# Patient Record
Sex: MALE | Race: WHITE | NOT HISPANIC OR LATINO | Employment: FULL TIME | URBAN - NONMETROPOLITAN AREA
[De-identification: names, ages, dates, MRNs, and addresses within clinical notes are randomized per-mention and may not be internally consistent; named-entity substitution may affect disease eponyms.]

---

## 2021-03-02 ENCOUNTER — IMMUNIZATION (OUTPATIENT)
Dept: VACCINE CLINIC | Facility: HOSPITAL | Age: 61
End: 2021-03-02

## 2021-03-02 PROCEDURE — 91300 HC SARSCOV02 VAC 30MCG/0.3ML IM: CPT | Performed by: NURSE PRACTITIONER

## 2021-03-02 PROCEDURE — 0001A: CPT | Performed by: NURSE PRACTITIONER

## 2021-03-23 ENCOUNTER — IMMUNIZATION (OUTPATIENT)
Dept: VACCINE CLINIC | Facility: HOSPITAL | Age: 61
End: 2021-03-23

## 2021-03-23 PROCEDURE — 0002A: CPT | Performed by: THORACIC SURGERY (CARDIOTHORACIC VASCULAR SURGERY)

## 2021-03-23 PROCEDURE — 91300 HC SARSCOV02 VAC 30MCG/0.3ML IM: CPT | Performed by: THORACIC SURGERY (CARDIOTHORACIC VASCULAR SURGERY)

## 2022-12-28 ENCOUNTER — OFFICE VISIT (OUTPATIENT)
Dept: FAMILY MEDICINE CLINIC | Facility: CLINIC | Age: 62
End: 2022-12-28

## 2022-12-28 VITALS
OXYGEN SATURATION: 98 % | BODY MASS INDEX: 32.48 KG/M2 | TEMPERATURE: 98.2 F | HEART RATE: 63 BPM | WEIGHT: 232 LBS | SYSTOLIC BLOOD PRESSURE: 142 MMHG | DIASTOLIC BLOOD PRESSURE: 72 MMHG | HEIGHT: 71 IN

## 2022-12-28 DIAGNOSIS — R53.83 OTHER FATIGUE: ICD-10-CM

## 2022-12-28 DIAGNOSIS — K91.2 MALNUTRITION FOLLOWING GASTROINTESTINAL SURGERY: ICD-10-CM

## 2022-12-28 DIAGNOSIS — D64.9 ANEMIA, UNSPECIFIED TYPE: Primary | ICD-10-CM

## 2022-12-28 PROCEDURE — 99204 OFFICE O/P NEW MOD 45 MIN: CPT | Performed by: FAMILY MEDICINE

## 2022-12-28 RX ORDER — METOPROLOL TARTRATE 50 MG/1
50 TABLET, FILM COATED ORAL 2 TIMES DAILY
COMMUNITY
Start: 2022-11-10 | End: 2023-04-03 | Stop reason: ALTCHOICE

## 2022-12-28 NOTE — PROGRESS NOTES
" Subjective   Blas James is a 62 y.o. male.     Chief Complaint   Patient presents with   • Establish Care             History of Present Illness     Works [employer].  Gets lab work done through them  Has had fatigue several months, blamed on covid.  Had labs at work, hg 6.4  Has not had a colonoscopy  He had mini gastric bypass 2007 in [place] dr andrade.   He says for  Years, had the annual tests and always were fine, so he just quit having them done.   No current chest pain or sob.  Just fatigue.     Review of Systems   Constitutional: Positive for fatigue. Negative for chills and fever.   HENT: Negative for congestion, ear discharge, ear pain, facial swelling, hearing loss, postnasal drip, rhinorrhea, sinus pressure, sore throat, trouble swallowing and voice change.    Eyes: Negative for discharge, redness and visual disturbance.   Respiratory: Negative for cough, chest tightness, shortness of breath and wheezing.    Cardiovascular: Negative for chest pain and palpitations.   Gastrointestinal: Negative for abdominal pain, blood in stool, constipation, diarrhea, nausea and vomiting.   Endocrine: Negative for polydipsia and polyuria.   Genitourinary: Negative for dysuria, flank pain, hematuria and urgency.   Musculoskeletal: Negative for arthralgias, back pain, joint swelling and myalgias.   Skin: Negative for rash.   Neurological: Negative for dizziness, weakness, numbness and headaches.   Hematological: Negative for adenopathy.   Psychiatric/Behavioral: Negative for confusion and sleep disturbance. The patient is not nervous/anxious.            /72 (BP Location: Left arm, Patient Position: Sitting, Cuff Size: Adult)   Pulse 63   Temp 98.2 °F (36.8 °C) (Infrared)   Ht 180.3 cm (71\")   Wt 105 kg (232 lb)   SpO2 98%   BMI 32.36 kg/m²       Objective     Physical Exam  Vitals and nursing note reviewed.   Constitutional:       Appearance: Normal appearance. He is well-developed.   HENT: "      Head: Normocephalic and atraumatic.      Right Ear: External ear normal.      Left Ear: External ear normal.      Nose: Nose normal. No rhinorrhea.   Eyes:      General: No scleral icterus.     Extraocular Movements: Extraocular movements intact.      Conjunctiva/sclera: Conjunctivae normal.      Pupils: Pupils are equal, round, and reactive to light.   Neck:      Vascular: No carotid bruit.   Cardiovascular:      Rate and Rhythm: Normal rate and regular rhythm.      Heart sounds: Normal heart sounds.     No friction rub. No gallop.   Pulmonary:      Effort: Pulmonary effort is normal.      Breath sounds: Normal breath sounds.   Abdominal:      General: Bowel sounds are normal. There is no distension.      Palpations: Abdomen is soft.      Tenderness: There is no abdominal tenderness.   Musculoskeletal:         General: No deformity. Normal range of motion.      Cervical back: Normal range of motion and neck supple.   Skin:     General: Skin is warm and dry.      Findings: No erythema or rash.   Neurological:      Mental Status: He is alert and oriented to person, place, and time.      Cranial Nerves: No cranial nerve deficit.   Psychiatric:         Behavior: Behavior normal.         Thought Content: Thought content normal.         Judgment: Judgment normal.             PAST MEDICAL HISTORY   No past medical history on file.   PAST SURGICAL HISTORY   No past surgical history on file.   SOCIAL HISTORY     Social History     Socioeconomic History   • Marital status:    Tobacco Use   • Smoking status: Never     Passive exposure: Never   • Smokeless tobacco: Never   Substance and Sexual Activity   • Alcohol use: Yes     Alcohol/week: 2.0 standard drinks     Types: 1 Cans of beer, 1 Shots of liquor per week   • Drug use: Never   • Sexual activity: Defer      ALLERGIES   Patient has no known allergies.   MEDICATIONS     Current Outpatient Medications   Medication Sig Dispense Refill   • LISINOPRIL PO 40 mg.      • metoprolol tartrate (LOPRESSOR) 50 MG tablet Take 50 mg by mouth 2 (Two) Times a Day.       No current facility-administered medications for this visit.        The following portions of the patient's history were reviewed and updated as appropriate: allergies, current medications, past family history, past medical history, past social history, past surgical history and problem list.        Assessment & Plan   Diagnoses and all orders for this visit:    1. Anemia, unspecified type (Primary)  -     Ambulatory Referral to Hematology    2. Malnutrition following gastrointestinal surgery  -     Ambulatory Referral to Hematology    3. Other fatigue  -     Ambulatory Referral to Hematology      I will arrange iron infusions at [facility].   I called [facility] and talked to rochelle cameron    Also    I ordered other labs he can  Have done at work.    I ordered b12.folate, b1, b6, ferritin, tsh, vit k, vit d, pth.   I gave him a stool fit test.     Cheryl at [facility] has been helpful.  She said the insurance company needed iron studies and referral to hematologist may help facilitate things.       I put in referral.     Cheryl at [facility] said she would call the patient and explain.     I did tell the patient over the phone to start ferrous sulfate 325mg daily    During our office visit, I told him he needs to increase his protein intake.                    No follow-ups on file.                  This document has been electronically signed by Trell Brooks MD on December 28, 2022 11:14 CST

## 2023-01-03 ENCOUNTER — TELEPHONE (OUTPATIENT)
Dept: FAMILY MEDICINE CLINIC | Facility: CLINIC | Age: 63
End: 2023-01-03
Payer: COMMERCIAL

## 2023-01-03 DIAGNOSIS — D64.9 ANEMIA, UNSPECIFIED TYPE: Primary | ICD-10-CM

## 2023-01-03 DIAGNOSIS — K91.2 MALNUTRITION FOLLOWING GASTROINTESTINAL SURGERY: ICD-10-CM

## 2023-01-03 DIAGNOSIS — R53.83 OTHER FATIGUE: ICD-10-CM

## 2023-01-03 NOTE — TELEPHONE ENCOUNTER
PATIENT'S WIFE SAID HE IS NOT ABLE TO GET HIS LABS DRAWN AT THE PLACE HE PLANNED TO GO THIS WEEK.  SHE ASKED IF YOU WILL PUT IN ORDERS FOR HIM TO GET THEM AT [facility] IN [place].

## 2023-01-04 ENCOUNTER — LAB (OUTPATIENT)
Dept: LAB | Facility: HOSPITAL | Age: 63
End: 2023-01-04
Payer: COMMERCIAL

## 2023-01-04 DIAGNOSIS — D64.9 ANEMIA, UNSPECIFIED TYPE: ICD-10-CM

## 2023-01-04 DIAGNOSIS — K91.2 MALNUTRITION FOLLOWING GASTROINTESTINAL SURGERY: ICD-10-CM

## 2023-01-04 DIAGNOSIS — R53.83 OTHER FATIGUE: ICD-10-CM

## 2023-01-04 LAB
25(OH)D3 SERPL-MCNC: 18.4 NG/ML (ref 30–100)
FERRITIN SERPL-MCNC: 4.79 NG/ML (ref 30–400)
FOLATE SERPL-MCNC: 6.18 NG/ML (ref 4.78–24.2)
IRON 24H UR-MRATE: 14 MCG/DL (ref 59–158)
IRON SATN MFR SERPL: 3 % (ref 20–50)
PTH-INTACT SERPL-MCNC: 106 PG/ML (ref 15–65)
TIBC SERPL-MCNC: 499 MCG/DL (ref 298–536)
TRANSFERRIN SERPL-MCNC: 335 MG/DL (ref 200–360)
TSH SERPL DL<=0.05 MIU/L-ACNC: 2.85 UIU/ML (ref 0.27–4.2)
VIT B12 BLD-MCNC: 497 PG/ML (ref 211–946)

## 2023-01-04 PROCEDURE — 83540 ASSAY OF IRON: CPT

## 2023-01-04 PROCEDURE — 82746 ASSAY OF FOLIC ACID SERUM: CPT

## 2023-01-04 PROCEDURE — 84443 ASSAY THYROID STIM HORMONE: CPT

## 2023-01-04 PROCEDURE — 83970 ASSAY OF PARATHORMONE: CPT

## 2023-01-04 PROCEDURE — 82607 VITAMIN B-12: CPT

## 2023-01-04 PROCEDURE — 84207 ASSAY OF VITAMIN B-6: CPT

## 2023-01-04 PROCEDURE — 85027 COMPLETE CBC AUTOMATED: CPT

## 2023-01-04 PROCEDURE — 82728 ASSAY OF FERRITIN: CPT

## 2023-01-04 PROCEDURE — 84466 ASSAY OF TRANSFERRIN: CPT

## 2023-01-04 PROCEDURE — 82306 VITAMIN D 25 HYDROXY: CPT

## 2023-01-04 PROCEDURE — 85045 AUTOMATED RETICULOCYTE COUNT: CPT

## 2023-01-04 PROCEDURE — 36415 COLL VENOUS BLD VENIPUNCTURE: CPT

## 2023-01-05 ENCOUNTER — PATIENT ROUNDING (BHMG ONLY) (OUTPATIENT)
Dept: FAMILY MEDICINE CLINIC | Facility: CLINIC | Age: 63
End: 2023-01-05
Payer: COMMERCIAL

## 2023-01-05 DIAGNOSIS — E55.9 VITAMIN D DEFICIENCY: Primary | ICD-10-CM

## 2023-01-05 DIAGNOSIS — K91.2 MALNUTRITION FOLLOWING GASTROINTESTINAL SURGERY: ICD-10-CM

## 2023-01-05 DIAGNOSIS — E21.3 HYPERPARATHYROIDISM: ICD-10-CM

## 2023-01-05 LAB
DEPRECATED RDW RBC AUTO: 46.5 FL (ref 37–54)
ERYTHROCYTE [DISTWIDTH] IN BLOOD BY AUTOMATED COUNT: 19.8 % (ref 12.3–15.4)
HCT VFR BLD AUTO: 25.6 % (ref 37.5–51)
HGB BLD-MCNC: 6.9 G/DL (ref 13–17.7)
MCH RBC QN AUTO: 18 PG (ref 26.6–33)
MCHC RBC AUTO-ENTMCNC: 27 G/DL (ref 31.5–35.7)
MCV RBC AUTO: 66.7 FL (ref 79–97)
PLATELET # BLD AUTO: 244 10*3/MM3 (ref 140–450)
RBC # BLD AUTO: 3.84 10*6/MM3 (ref 4.14–5.8)
RETICS # AUTO: 0.08 10*6/MM3 (ref 0.02–0.13)
RETICS/RBC NFR AUTO: 2.07 % (ref 0.7–1.9)
WBC NRBC COR # BLD: 7.64 10*3/MM3 (ref 3.4–10.8)

## 2023-01-05 NOTE — PROGRESS NOTES
January 5, 2023    Hello, may I speak with Blas James?    My name is Maxine Tejeda      I am  with [facility] [place]  [address]  [place] [address] [phone].    Before we get started may I verify your date of birth? 1960    I am calling to officially welcome you to our practice and ask about your recent visit. Is this a good time to talk? no    Tell me about your visit with us. What things went well?  left voicemail       We're always looking for ways to make our patients' experiences even better. Do you have recommendations on ways we may improve?  left voicemail    Overall were you satisfied with your first visit to our practice? Left voicemail       I appreciate you taking the time to speak with me today. Is there anything else I can do for you? Left voicemail      Thank you, and have a great day.

## 2023-01-09 LAB — PYRIDOXAL PHOS SERPL-MCNC: 8 UG/L (ref 3.4–65.2)

## 2023-01-10 DIAGNOSIS — Z12.11 ENCOUNTER FOR SCREENING FECAL OCCULT BLOOD TESTING: Primary | ICD-10-CM

## 2023-01-10 LAB — HEMOCCULT STL QL IA: NEGATIVE

## 2023-01-10 PROCEDURE — 82274 ASSAY TEST FOR BLOOD FECAL: CPT | Performed by: FAMILY MEDICINE

## 2023-01-11 ENCOUNTER — CONSULT (OUTPATIENT)
Dept: ONCOLOGY | Facility: CLINIC | Age: 63
End: 2023-01-11
Payer: COMMERCIAL

## 2023-01-11 ENCOUNTER — LAB (OUTPATIENT)
Dept: ONCOLOGY | Facility: HOSPITAL | Age: 63
End: 2023-01-11
Payer: COMMERCIAL

## 2023-01-11 VITALS
OXYGEN SATURATION: 98 % | WEIGHT: 234.3 LBS | DIASTOLIC BLOOD PRESSURE: 83 MMHG | BODY MASS INDEX: 32.68 KG/M2 | SYSTOLIC BLOOD PRESSURE: 162 MMHG | TEMPERATURE: 98 F | HEART RATE: 64 BPM

## 2023-01-11 DIAGNOSIS — D50.0 IRON DEFICIENCY ANEMIA DUE TO CHRONIC BLOOD LOSS: ICD-10-CM

## 2023-01-11 DIAGNOSIS — K90.9 IRON MALABSORPTION: ICD-10-CM

## 2023-01-11 DIAGNOSIS — Z98.84 HISTORY OF GASTRIC BYPASS: ICD-10-CM

## 2023-01-11 DIAGNOSIS — D50.0 IRON DEFICIENCY ANEMIA DUE TO CHRONIC BLOOD LOSS: Primary | ICD-10-CM

## 2023-01-11 PROBLEM — D50.9 IRON DEFICIENCY ANEMIA: Status: ACTIVE | Noted: 2023-01-11

## 2023-01-11 LAB
BASOPHILS # BLD AUTO: 0.13 10*3/MM3 (ref 0–0.2)
BASOPHILS NFR BLD AUTO: 1.5 % (ref 0–1.5)
DEPRECATED RDW RBC AUTO: 55.8 FL (ref 37–54)
EOSINOPHIL # BLD AUTO: 0.16 10*3/MM3 (ref 0–0.4)
EOSINOPHIL NFR BLD AUTO: 1.8 % (ref 0.3–6.2)
ERYTHROCYTE [DISTWIDTH] IN BLOOD BY AUTOMATED COUNT: 22.9 % (ref 12.3–15.4)
FERRITIN SERPL-MCNC: 5.56 NG/ML (ref 30–400)
HCT VFR BLD AUTO: 28.5 % (ref 37.5–51)
HGB BLD-MCNC: 7.5 G/DL (ref 13–17.7)
IMM GRANULOCYTES # BLD AUTO: 0.04 10*3/MM3 (ref 0–0.05)
IMM GRANULOCYTES NFR BLD AUTO: 0.5 % (ref 0–0.5)
IRON 24H UR-MRATE: 13 MCG/DL (ref 59–158)
IRON SATN MFR SERPL: 2 % (ref 20–50)
LYMPHOCYTES # BLD AUTO: 2.82 10*3/MM3 (ref 0.7–3.1)
LYMPHOCYTES NFR BLD AUTO: 31.8 % (ref 19.6–45.3)
MCH RBC QN AUTO: 18.2 PG (ref 26.6–33)
MCHC RBC AUTO-ENTMCNC: 26.3 G/DL (ref 31.5–35.7)
MCV RBC AUTO: 69.3 FL (ref 79–97)
MONOCYTES # BLD AUTO: 0.57 10*3/MM3 (ref 0.1–0.9)
MONOCYTES NFR BLD AUTO: 6.4 % (ref 5–12)
NEUTROPHILS NFR BLD AUTO: 5.15 10*3/MM3 (ref 1.7–7)
NEUTROPHILS NFR BLD AUTO: 58 % (ref 42.7–76)
NRBC BLD AUTO-RTO: 0 /100 WBC (ref 0–0.2)
PLATELET # BLD AUTO: 279 10*3/MM3 (ref 140–450)
PMV BLD AUTO: 9.8 FL (ref 6–12)
RBC # BLD AUTO: 4.11 10*6/MM3 (ref 4.14–5.8)
TIBC SERPL-MCNC: 538 MCG/DL (ref 298–536)
TRANSFERRIN SERPL-MCNC: 361 MG/DL (ref 200–360)
WBC NRBC COR # BLD: 8.87 10*3/MM3 (ref 3.4–10.8)

## 2023-01-11 PROCEDURE — 1125F AMNT PAIN NOTED PAIN PRSNT: CPT | Performed by: INTERNAL MEDICINE

## 2023-01-11 PROCEDURE — 82728 ASSAY OF FERRITIN: CPT | Performed by: INTERNAL MEDICINE

## 2023-01-11 PROCEDURE — 83540 ASSAY OF IRON: CPT | Performed by: INTERNAL MEDICINE

## 2023-01-11 PROCEDURE — 85025 COMPLETE CBC W/AUTO DIFF WBC: CPT | Performed by: INTERNAL MEDICINE

## 2023-01-11 PROCEDURE — 99204 OFFICE O/P NEW MOD 45 MIN: CPT | Performed by: INTERNAL MEDICINE

## 2023-01-11 PROCEDURE — 84466 ASSAY OF TRANSFERRIN: CPT | Performed by: INTERNAL MEDICINE

## 2023-01-11 PROCEDURE — G0463 HOSPITAL OUTPT CLINIC VISIT: HCPCS | Performed by: INTERNAL MEDICINE

## 2023-01-11 RX ORDER — FOLIC ACID 1 MG/1
1 TABLET ORAL DAILY
Qty: 90 TABLET | Refills: 1 | Status: SHIPPED | OUTPATIENT
Start: 2023-01-11

## 2023-01-11 RX ORDER — MULTIVIT WITH MINERALS/LUTEIN
250 TABLET ORAL DAILY
COMMUNITY
End: 2023-04-03

## 2023-01-11 RX ORDER — CETIRIZINE HYDROCHLORIDE 10 MG/1
10 TABLET ORAL DAILY
COMMUNITY
End: 2023-04-03

## 2023-01-11 RX ORDER — MULTIPLE VITAMINS W/ MINERALS TAB 9MG-400MCG
1 TAB ORAL DAILY
COMMUNITY

## 2023-01-11 RX ORDER — LANOLIN ALCOHOL/MO/W.PET/CERES
1000 CREAM (GRAM) TOPICAL DAILY
Qty: 90 TABLET | Refills: 1 | Status: SHIPPED | OUTPATIENT
Start: 2023-01-11

## 2023-01-11 RX ORDER — FERROUS SULFATE 325(65) MG
325 TABLET ORAL
COMMUNITY
End: 2023-03-24

## 2023-01-11 NOTE — PROGRESS NOTES
DATE OF CONSULT: 1/12/2023    REQUESTING SOURCE:  rTell Brooks MD  [address] [place] [id number]      REASON FOR CONSULTATION: Iron deficiency anemia, history of gastric bypass      HISTORY OF PRESENT ILLNESS:    62-year-old male with medical problem consisting of hypertension, history of gastric bypass in 2007, chronic back pain and neck pain has been referred to [facility] for further evaluation and recommendation regarding severe iron deficiency anemia with hemoglobin of 6.9 on January 4, 2023.  Patient states he has been taking ferrous sulfate 2 tablets twice daily since December 29, 2022.  Continues to have fatigue.  Denies any dark stools or any bright red bleeding per rectum.  States he had intermittent implanted bleeding in stool which she thought was from hemorrhoids.  Complains of exertional shortness of breath.  States his fatigue has been progressively getting worse over the last 2 years.  States he was found to be anemic with hemoglobin of 6.5 around Thanksgiving at work.  He has not had any blood work done for the last many years prior to Thanksgiving.  Complains of intermittent tingling and numbness affecting upper extremity.  Admits to 15 pound of weight loss in last 1 year.  States his appetite is good.  Denies any new lymph node enlargement.              PAST MEDICAL HISTORY:    No past medical history on file.    PAST SURGICAL HISTORY:  No past surgical history on file.    ALLERGIES:    No Known Allergies    SOCIAL HISTORY:   Social History     Tobacco Use   • Smoking status: Never     Passive exposure: Never   • Smokeless tobacco: Never   Substance Use Topics   • Alcohol use: Yes     Alcohol/week: 2.0 standard drinks     Types: 1 Cans of beer, 1 Shots of liquor per week   • Drug use: Never       CURRENT MEDICATIONS:    Current Outpatient Medications   Medication Sig Dispense Refill   • cetirizine (zyrTEC) 10 MG tablet Take 10 mg by mouth Daily.     • ferrous  sulfate 325 (65 FE) MG tablet Take 325 mg by mouth Daily With Breakfast.     • LISINOPRIL PO 40 mg.     • metoprolol tartrate (LOPRESSOR) 50 MG tablet Take 50 mg by mouth 2 (Two) Times a Day.     • multivitamin with minerals tablet tablet Take 1 tablet by mouth Daily.     • vitamin C (ASCORBIC ACID) 250 MG tablet Take 250 mg by mouth Daily.     • folic acid (FOLVITE) 1 MG tablet Take 1 tablet by mouth Daily. 90 tablet 1   • vitamin B-12 (CYANOCOBALAMIN) 1000 MCG tablet Take 1 tablet by mouth Daily. 90 tablet 1     No current facility-administered medications for this visit.        HOME MEDICATIONS:   Current Outpatient Medications on File Prior to Visit   Medication Sig Dispense Refill   • cetirizine (zyrTEC) 10 MG tablet Take 10 mg by mouth Daily.     • ferrous sulfate 325 (65 FE) MG tablet Take 325 mg by mouth Daily With Breakfast.     • LISINOPRIL PO 40 mg.     • metoprolol tartrate (LOPRESSOR) 50 MG tablet Take 50 mg by mouth 2 (Two) Times a Day.     • multivitamin with minerals tablet tablet Take 1 tablet by mouth Daily.     • vitamin C (ASCORBIC ACID) 250 MG tablet Take 250 mg by mouth Daily.       No current facility-administered medications on file prior to visit.       FAMILY HISTORY:    No family history on file.    REVIEW OF SYSTEMS:        CONSTITUTIONAL:  Complains of fatigue.  Positive for 15 pound of weight loss in 1 year.  Denies any fever, chills .     EYES: No visual disturbances. No discharge. No new lesion.    ENMT:  No epistaxis, mouth sores or difficulty swallowing.    RESPIRATORY: Positive for shortness of breath with exertion. No new cough or hemoptysis.    CARDIOVASCULAR:  No chest pain or palpitations.    GASTROINTESTINAL:  No abdominal pain nausea, vomiting or blood in the stool.    GENITOURINARY: No dysuria or hematuria.    MUSCULOSKELETAL: Positive for chronic back pain and neck pain    LYMPHATICS:  Denies any abnormal swollen glands anywhere in the body.    NEUROLOGICAL : Positive for  intermittent tingling and numbness affecting bilateral upper extremity. No headache or dizziness. No seizures or balance problems.    SKIN: No new skin lesion.    ENDOCRINE : No new hot or cold intolerance. No new polyuria. No polydipsia.        PHYSICAL EXAMINATION:      VITAL SIGNS:  /83   Pulse 64   Temp 98 °F (36.7 °C) (Temporal)   Wt 106 kg (234 lb 4.8 oz)   SpO2 98%   BMI 32.68 kg/m²       01/11/23  1201   Weight: 106 kg (234 lb 4.8 oz)       ECOG performance status: 1    CONSTITUTIONAL:  Not in any distress.    EYES:  conjunctival pallor present. No Icterus. No pterygium. Extraocular movements intact. No ptosis.    ENMT:  Normocephalic, atraumatic. No facial asymmetry noted.    NECK:  No adenopathy. Trachea midline. No JVD.    RESPIRATORY:  Fair air entry bilateral. No rhonchi or wheezing. Fair respiratory effort.    CARDIOVASCULAR:  S1, S2. Regular rate and rhythm. No murmur or gallop appreciated.    ABDOMEN:  Soft, obese, nontender. Bowel sounds present in all four quadrants.  No hepatosplenomegaly appreciated.    MUSCULOSKELETAL:  No edema. No calf tenderness.  Decreased range of motion.    NEUROLOGIC:    No motor  deficit appreciated. Cranial nerves II-XII grossly intact.    SKIN : No new skin lesion identified. Skin is warm and dry to touch.    LYMPHATICS: No new enlarged lymph nodes in neck or supraclavicular area.    PSYCHIATRY: Alert, awake and oriented ×3. Normal affect.  Normal judgment.  Makes good eye contact.          DIAGNOSTIC DATA:    WBC   Date Value Ref Range Status   01/11/2023 8.87 3.40 - 10.80 10*3/mm3 Final     RBC   Date Value Ref Range Status   01/11/2023 4.11 (L) 4.14 - 5.80 10*6/mm3 Final     Hemoglobin   Date Value Ref Range Status   01/11/2023 7.5 (L) 13.0 - 17.7 g/dL Final     Hematocrit   Date Value Ref Range Status   01/11/2023 28.5 (L) 37.5 - 51.0 % Final     MCV   Date Value Ref Range Status   01/11/2023 69.3 (L) 79.0 - 97.0 fL Final     MCH   Date Value Ref Range  Status   01/11/2023 18.2 (L) 26.6 - 33.0 pg Final     MCHC   Date Value Ref Range Status   01/11/2023 26.3 (L) 31.5 - 35.7 g/dL Final     RDW   Date Value Ref Range Status   01/11/2023 22.9 (H) 12.3 - 15.4 % Final     RDW-SD   Date Value Ref Range Status   01/11/2023 55.8 (H) 37.0 - 54.0 fl Final     MPV   Date Value Ref Range Status   01/11/2023 9.8 6.0 - 12.0 fL Final     Platelets   Date Value Ref Range Status   01/11/2023 279 140 - 450 10*3/mm3 Final     Neutrophil %   Date Value Ref Range Status   01/11/2023 58.0 42.7 - 76.0 % Final     Lymphocyte %   Date Value Ref Range Status   01/11/2023 31.8 19.6 - 45.3 % Final     Monocyte %   Date Value Ref Range Status   01/11/2023 6.4 5.0 - 12.0 % Final     Eosinophil %   Date Value Ref Range Status   01/11/2023 1.8 0.3 - 6.2 % Final     Basophil %   Date Value Ref Range Status   01/11/2023 1.5 0.0 - 1.5 % Final     Immature Grans %   Date Value Ref Range Status   01/11/2023 0.5 0.0 - 0.5 % Final     Neutrophils, Absolute   Date Value Ref Range Status   01/11/2023 5.15 1.70 - 7.00 10*3/mm3 Final     Lymphocytes, Absolute   Date Value Ref Range Status   01/11/2023 2.82 0.70 - 3.10 10*3/mm3 Final     Monocytes, Absolute   Date Value Ref Range Status   01/11/2023 0.57 0.10 - 0.90 10*3/mm3 Final     Eosinophils, Absolute   Date Value Ref Range Status   01/11/2023 0.16 0.00 - 0.40 10*3/mm3 Final     Basophils, Absolute   Date Value Ref Range Status   01/11/2023 0.13 0.00 - 0.20 10*3/mm3 Final     Immature Grans, Absolute   Date Value Ref Range Status   01/11/2023 0.04 0.00 - 0.05 10*3/mm3 Final     nRBC   Date Value Ref Range Status   01/11/2023 0.0 0.0 - 0.2 /100 WBC Final     No results found for: GLUCOSE, NA, K, CO2, CL, ANIONGAP, CREATININE, BUN, BCR, CALCIUM, EGFRIFNONA, ALKPHOS, PROTEINTOT, ALT, AST, BILITOT, ALBUMIN, GLOB, LABIL2  Lab Results   Component Value Date    IRON 13 (L) 01/11/2023    TIBC 538 (H) 01/11/2023    LABIRON 2 (L) 01/11/2023    FERRITIN 5.56 (L)  01/11/2023    XAUSUBZE06 497 01/04/2023    FOLATE 6.18 01/04/2023     No results found for: , LABCA2, AFPTM, HCGQUANT, , CHROMGRNA, [id number], CEA, REFLABREPO]    Radiology Data :  No radiology results for the last 7 days    Pathology :  * Cannot find OR log *    Assessment and plan:    1.  Iron deficiency anemia:  - Anemia work-up done on January 4, 2023 shows severe iron deficiency anemia with ferritin of 4, iron saturation of 3% and hemoglobin of 6.9  - Patient has been taking ferrous sulfate 2 tablets twice daily for the last 2 weeks.  - Repeat blood work today shows hemoglobin is 7.5 with MCV of 69.  Iron studies from today is not showing any improvement with iron saturation at 2% with ferritin of 5.  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  Recommend starting intravenous Venofer starting next week.  Side effect of Venofer including allergic reaction were discussed with patient.  - Discussed with patient and his severe iron deficiency is worrisome for ongoing GI blood loss.  Recommend getting endoscopy evaluation by gastroenterology team.  Referral has been placed for gastroenterology team in urgent manner today.  - B12 level is 497, folate level is 6, recommend starting B12 and folic acid p.o. daily.  Prescription for B12 and folic acid has been sent to his pharmacy today  - We will have patient return to clinic in 2 months with repeat CBC, iron studies, ferritin, B12 and folate to be done on that day.    2.  History of gastric bypass    3.  Hypertension    4.  Health maintenance: Patient does not smoke.  Referral has been placed for gastroenterology team for endoscopies today          PHQ-9 Total Score: 0   -Patient is not homicidal or suicidal.  No acute intervention required.      Blas James reports a pain score of 4.  Given his pain assessment as noted, treatment options were discussed and the following options were decided upon as a follow-up plan to address the  patient's pain: continuation of current treatment plan for pain.             Thank you for this consultation.    Awais Malave MD  1/12/2023  07:27 CST        Part of this note may be an electronic transcription/translation of spoken language to printed text using the Dragon Dictation System.

## 2023-01-11 NOTE — LETTER
January 11, 2023     Trell Brooks MD  [address] [place] [id number]    Patient: Blas James   YOB: 1960   Date of Visit: 1/11/2023       Dear Trell Brooks MD:    Thank you for referring Blas James to me for evaluation. Below are the relevant portions of my assessment and plan of care.    Encounter Diagnosis and Orders:  Diagnoses and all orders for this visit:    1. Iron deficiency anemia due to chronic blood loss (Primary)  -     CBC & Differential  -     Iron and TIBC  -     Ferritin  -     Ambulatory Referral to Gastroenterology    2. History of gastric bypass    3. Iron malabsorption    Other orders  -     folic acid (FOLVITE) 1 MG tablet; Take 1 tablet by mouth Daily.  Dispense: 90 tablet; Refill: 1  -     vitamin B-12 (CYANOCOBALAMIN) 1000 MCG tablet; Take 1 tablet by mouth Daily.  Dispense: 90 tablet; Refill: 1  -     sodium chloride 0.9 % infusion 250 mL  -     acetaminophen (TYLENOL) tablet 650 mg  -     diphenhydrAMINE (BENADRYL) capsule 25 mg  -     famotidine (PEPCID) tablet 20 mg  -     iron sucrose (VENOFER) 200 mg in sodium chloride 0.9 % 100 mL IVPB  -     Hydrocortisone Sod Suc (PF) (Solu-CORTEF) injection 50 mg  -     Hydrocortisone Sod Suc (PF) (Solu-CORTEF) injection 100 mg  -     diphenhydrAMINE (BENADRYL) injection 50 mg  -     famotidine (PEPCID) injection 20 mg        If you have questions, please do not hesitate to call me. I look forward to following Blas along with you.         Sincerely,        Awais Malave MD        CC: No Recipients

## 2023-01-12 ENCOUNTER — TELEPHONE (OUTPATIENT)
Dept: ONCOLOGY | Facility: CLINIC | Age: 63
End: 2023-01-12
Payer: COMMERCIAL

## 2023-01-12 PROBLEM — K90.9 IRON MALABSORPTION: Status: ACTIVE | Noted: 2023-01-12

## 2023-01-12 RX ORDER — DIPHENHYDRAMINE HCL 25 MG
25 CAPSULE ORAL ONCE
Status: CANCELLED | OUTPATIENT
Start: 2023-01-18

## 2023-01-12 RX ORDER — FAMOTIDINE 10 MG/ML
20 INJECTION, SOLUTION INTRAVENOUS AS NEEDED
Status: CANCELLED | OUTPATIENT
Start: 2023-01-18

## 2023-01-12 RX ORDER — FAMOTIDINE 20 MG/1
20 TABLET, FILM COATED ORAL ONCE
Status: CANCELLED | OUTPATIENT
Start: 2023-01-18

## 2023-01-12 RX ORDER — DIPHENHYDRAMINE HYDROCHLORIDE 50 MG/ML
50 INJECTION INTRAMUSCULAR; INTRAVENOUS AS NEEDED
Status: CANCELLED | OUTPATIENT
Start: 2023-01-18

## 2023-01-12 RX ORDER — ACETAMINOPHEN 325 MG/1
650 TABLET ORAL ONCE
Status: CANCELLED | OUTPATIENT
Start: 2023-01-18

## 2023-01-12 RX ORDER — SODIUM CHLORIDE 9 MG/ML
250 INJECTION, SOLUTION INTRAVENOUS ONCE
Status: CANCELLED | OUTPATIENT
Start: 2023-01-18

## 2023-01-12 NOTE — TELEPHONE ENCOUNTER
----- Message from Awais Malave MD sent at 1/12/2023  7:31 AM CST -----  Please let patient know, his iron level from today is not showing any improvement.  His iron saturation is actually lower than what it was last week consistent with iron malabsorption.  Recommend starting intravenous Venofer starting next week.  He needs to start Venofer as soon as possible once we get it approved through insurance company.  Recommend starting B12 and folic acid as we discussed in clinic yesterday.  Thank you

## 2023-01-12 NOTE — TELEPHONE ENCOUNTER
Called and spoke with pt regarding lab results and medication instructions as per Dr. Malave, v/u obtained.

## 2023-01-13 ENCOUNTER — OFFICE VISIT (OUTPATIENT)
Dept: GASTROENTEROLOGY | Facility: CLINIC | Age: 63
End: 2023-01-13
Payer: COMMERCIAL

## 2023-01-13 VITALS
HEIGHT: 71 IN | SYSTOLIC BLOOD PRESSURE: 160 MMHG | DIASTOLIC BLOOD PRESSURE: 79 MMHG | WEIGHT: 232.6 LBS | HEART RATE: 59 BPM | BODY MASS INDEX: 32.56 KG/M2

## 2023-01-13 DIAGNOSIS — D50.8 OTHER IRON DEFICIENCY ANEMIA: ICD-10-CM

## 2023-01-13 DIAGNOSIS — Z98.84 HISTORY OF GASTRIC BYPASS: Primary | ICD-10-CM

## 2023-01-13 PROCEDURE — 99204 OFFICE O/P NEW MOD 45 MIN: CPT | Performed by: INTERNAL MEDICINE

## 2023-01-13 RX ORDER — DEXTROSE AND SODIUM CHLORIDE 5; .45 G/100ML; G/100ML
30 INJECTION, SOLUTION INTRAVENOUS CONTINUOUS PRN
Status: CANCELLED | OUTPATIENT
Start: 2023-01-17

## 2023-01-13 RX ORDER — SODIUM CHLORIDE 9 MG/ML
40 INJECTION, SOLUTION INTRAVENOUS AS NEEDED
Status: CANCELLED | OUTPATIENT
Start: 2023-01-17

## 2023-01-17 ENCOUNTER — ANESTHESIA (OUTPATIENT)
Dept: GASTROENTEROLOGY | Facility: HOSPITAL | Age: 63
End: 2023-01-17
Payer: COMMERCIAL

## 2023-01-17 ENCOUNTER — ANESTHESIA EVENT (OUTPATIENT)
Dept: GASTROENTEROLOGY | Facility: HOSPITAL | Age: 63
End: 2023-01-17
Payer: COMMERCIAL

## 2023-01-17 ENCOUNTER — HOSPITAL ENCOUNTER (OUTPATIENT)
Facility: HOSPITAL | Age: 63
Setting detail: HOSPITAL OUTPATIENT SURGERY
Discharge: HOME | End: 2023-01-17
Attending: INTERNAL MEDICINE | Admitting: INTERNAL MEDICINE
Payer: COMMERCIAL

## 2023-01-17 VITALS
HEIGHT: 70 IN | RESPIRATION RATE: 16 BRPM | SYSTOLIC BLOOD PRESSURE: 128 MMHG | OXYGEN SATURATION: 97 % | WEIGHT: 230 LBS | TEMPERATURE: 98 F | HEART RATE: 65 BPM | BODY MASS INDEX: 32.93 KG/M2 | DIASTOLIC BLOOD PRESSURE: 63 MMHG

## 2023-01-17 DIAGNOSIS — D50.8 OTHER IRON DEFICIENCY ANEMIA: ICD-10-CM

## 2023-01-17 DIAGNOSIS — Z98.84 HISTORY OF GASTRIC BYPASS: ICD-10-CM

## 2023-01-17 PROCEDURE — 25010000002 PROPOFOL 10 MG/ML EMULSION: Performed by: NURSE ANESTHETIST, CERTIFIED REGISTERED

## 2023-01-17 PROCEDURE — 45380 COLONOSCOPY AND BIOPSY: CPT | Performed by: INTERNAL MEDICINE

## 2023-01-17 PROCEDURE — 43239 EGD BIOPSY SINGLE/MULTIPLE: CPT | Performed by: INTERNAL MEDICINE

## 2023-01-17 RX ORDER — DEXTROSE AND SODIUM CHLORIDE 5; .45 G/100ML; G/100ML
30 INJECTION, SOLUTION INTRAVENOUS CONTINUOUS PRN
Status: DISCONTINUED | OUTPATIENT
Start: 2023-01-17 | End: 2023-01-17 | Stop reason: HOSPADM

## 2023-01-17 RX ORDER — ONDANSETRON 2 MG/ML
4 INJECTION INTRAMUSCULAR; INTRAVENOUS ONCE AS NEEDED
Status: DISCONTINUED | OUTPATIENT
Start: 2023-01-17 | End: 2023-01-17 | Stop reason: HOSPADM

## 2023-01-17 RX ORDER — PROPOFOL 10 MG/ML
VIAL (ML) INTRAVENOUS AS NEEDED
Status: DISCONTINUED | OUTPATIENT
Start: 2023-01-17 | End: 2023-01-17 | Stop reason: SURG

## 2023-01-17 RX ORDER — PROMETHAZINE HYDROCHLORIDE 25 MG/1
25 TABLET ORAL ONCE AS NEEDED
Status: DISCONTINUED | OUTPATIENT
Start: 2023-01-17 | End: 2023-01-17 | Stop reason: HOSPADM

## 2023-01-17 RX ORDER — SODIUM CHLORIDE 9 MG/ML
40 INJECTION, SOLUTION INTRAVENOUS AS NEEDED
Status: DISCONTINUED | OUTPATIENT
Start: 2023-01-17 | End: 2023-01-17 | Stop reason: HOSPADM

## 2023-01-17 RX ORDER — PROMETHAZINE HYDROCHLORIDE 25 MG/1
25 SUPPOSITORY RECTAL ONCE AS NEEDED
Status: DISCONTINUED | OUTPATIENT
Start: 2023-01-17 | End: 2023-01-17 | Stop reason: HOSPADM

## 2023-01-17 RX ORDER — MEPERIDINE HYDROCHLORIDE 25 MG/ML
12.5 INJECTION INTRAMUSCULAR; INTRAVENOUS; SUBCUTANEOUS
Status: DISCONTINUED | OUTPATIENT
Start: 2023-01-17 | End: 2023-01-17 | Stop reason: HOSPADM

## 2023-01-17 RX ADMIN — PROPOFOL 40 MG: 10 INJECTION, EMULSION INTRAVENOUS at 13:25

## 2023-01-17 RX ADMIN — PROPOFOL 90 MG: 10 INJECTION, EMULSION INTRAVENOUS at 13:21

## 2023-01-17 RX ADMIN — PROPOFOL 30 MG: 10 INJECTION, EMULSION INTRAVENOUS at 13:23

## 2023-01-17 RX ADMIN — PROPOFOL 30 MG: 10 INJECTION, EMULSION INTRAVENOUS at 13:29

## 2023-01-17 RX ADMIN — DEXTROSE AND SODIUM CHLORIDE 30 ML/HR: 5; 450 INJECTION, SOLUTION INTRAVENOUS at 12:55

## 2023-01-17 RX ADMIN — PROPOFOL 30 MG: 10 INJECTION, EMULSION INTRAVENOUS at 13:32

## 2023-01-17 NOTE — ANESTHESIA POSTPROCEDURE EVALUATION
Patient: Blas James    Procedure Summary     Date: 01/17/23 Room / Location: [facility] ENDOSCOPY 2 / [facility] ENDOSCOPY    Anesthesia Start: 1311 Anesthesia Stop: 1336    Procedures:       ESOPHAGOGASTRODUODENOSCOPY      COLONOSCOPY Diagnosis:       History of gastric bypass      Other iron deficiency anemia      (History of gastric bypass [Z98.84])      (Other iron deficiency anemia [D50.8])    Surgeons: Carlton Olson MD Provider: Mercedes Rocha CRNA    Anesthesia Type: general ASA Status: 2          Anesthesia Type: general    Vitals  No vitals data found for the desired time range.          Post Anesthesia Care and Evaluation    Patient location during evaluation: PACU  Patient participation: complete - patient participated  Level of consciousness: awake and alert  Pain management: adequate    Airway patency: patent  Anesthetic complications: No anesthetic complications    Cardiovascular status: acceptable  Respiratory status: acceptable  Hydration status: acceptable    Comments: 127/69  61 bpm  20 rpm  100%  No anesthesia care post op

## 2023-01-17 NOTE — H&P
Chief Complaint:   Chief Complaint   Patient presents with   • Anemia       Subjective     HPI:   Mr. Strauss is a 70-year-old  male with past medical history of hyperlipidemia, seasonal allergies, gastric bypass presenting for evaluation for anemia.  He has new onset anemia with microcytosis.  Most recent MCV was 69.  Currently receiving iron supplements.  He underwent gastric bypass in 2007.  He has fatigue and intermittent diarrhea and denied abdominal pain, nausea, vomiting, constipation, rectal bleeding or weight loss.  His most recent alk phos was 140 and LFTs are normal otherwise.    Past Medical History:   Past Medical History:   Diagnosis Date   • Hyperlipidemia    • Seasonal allergies        Past Surgical History:  Past Surgical History:   Procedure Laterality Date   • GASTRIC BYPASS         Family History:  History reviewed. No pertinent family history.    Social History:   reports that he has never smoked. He has never been exposed to tobacco smoke. He has never used smokeless tobacco. He reports current alcohol use of about 2.0 standard drinks per week. He reports that he does not use drugs.    Medications:   Prior to Admission medications    Medication Sig Start Date End Date Taking? Authorizing Provider   cetirizine (zyrTEC) 10 MG tablet Take 10 mg by mouth Daily.   Yes Sanford Bedoya MD   ferrous sulfate 325 (65 FE) MG tablet Take 325 mg by mouth Daily With Breakfast.   Yes Sanford Bedoya MD   folic acid (FOLVITE) 1 MG tablet Take 1 tablet by mouth Daily. 1/11/23  Yes Awais Malave MD   LISINOPRIL PO Take 40 mg by mouth Daily.   Yes Sanford Bedoya MD   metoprolol tartrate (LOPRESSOR) 50 MG tablet Take 50 mg by mouth 2 (Two) Times a Day. 11/10/22  Yes Sanford Bedoya MD   multivitamin with minerals tablet tablet Take 1 tablet by mouth Daily.   Yes Sanford Bedoya MD   vitamin B-12 (CYANOCOBALAMIN) 1000 MCG tablet Take 1 tablet by mouth Daily. 1/11/23  Yes  "Awais Malave MD   vitamin C (ASCORBIC ACID) 250 MG tablet Take 250 mg by mouth Daily.   Yes Provider, MD Sanford       Allergies:  Patient has no known allergies.    ROS:    Review of Systems   Constitutional: Positive for fatigue. Negative for chills, fever and unexpected weight change.   HENT: Negative for congestion, ear discharge, hearing loss, nosebleeds and sore throat.    Eyes: Negative for pain, discharge and redness.   Respiratory: Negative for cough, chest tightness, shortness of breath and wheezing.    Cardiovascular: Negative for chest pain and palpitations.   Gastrointestinal: Positive for diarrhea. Negative for abdominal distention, abdominal pain, blood in stool, constipation, nausea and vomiting.   Endocrine: Negative for cold intolerance, polydipsia, polyphagia and polyuria.   Genitourinary: Negative for dysuria, flank pain, frequency, hematuria and urgency.   Musculoskeletal: Negative for arthralgias, back pain, joint swelling and myalgias.   Skin: Negative for color change, pallor and rash.   Neurological: Negative for tremors, seizures, syncope, weakness and headaches.   Hematological: Negative for adenopathy. Does not bruise/bleed easily.   Psychiatric/Behavioral: Negative for behavioral problems, confusion, dysphoric mood, hallucinations and suicidal ideas. The patient is not nervous/anxious.      Objective     /79 (BP Location: Left arm)   Pulse 59   Ht 180.3 cm (71\")   Wt 106 kg (232 lb 9.6 oz)   BMI 32.44 kg/m²     Physical Exam  Constitutional:       Appearance: He is well-developed.   HENT:      Head: Normocephalic and atraumatic.   Eyes:      Conjunctiva/sclera: Conjunctivae normal.      Pupils: Pupils are equal, round, and reactive to light.   Neck:      Thyroid: No thyromegaly.   Cardiovascular:      Rate and Rhythm: Normal rate and regular rhythm.      Heart sounds: Normal heart sounds. No murmur heard.  Pulmonary:      Effort: Pulmonary effort is normal.      Breath " sounds: Normal breath sounds. No wheezing.   Abdominal:      General: Bowel sounds are normal. There is no distension.      Palpations: Abdomen is soft. There is no mass.      Tenderness: There is no abdominal tenderness.      Hernia: No hernia is present.   Genitourinary:     Comments: No lesions noted  Musculoskeletal:         General: No tenderness. Normal range of motion.      Cervical back: Normal range of motion and neck supple.   Lymphadenopathy:      Cervical: No cervical adenopathy.   Skin:     General: Skin is warm and dry.      Findings: No rash.   Neurological:      Mental Status: He is alert and oriented to person, place, and time.      Cranial Nerves: No cranial nerve deficit.   Psychiatric:         Thought Content: Thought content normal.        Extremities: No edema, cyanosis or clubbing.    Assessment & Plan    1.  Microcytic anemia with iron deficiency likely due to gastric bypass and need to rule out GI blood loss.  Continue iron supplements.  Proceed with EGD and colonoscopy for further evaluation.  Follow H&H closely.  2.  Diarrhea, likely due to gastric bypass.  Need to rule out IBD, celiac sprue and colorectal neoplasia.  Add Imodium as needed.  Proceed with colonoscopy for further evaluation.  3.  Fatigue, likely due to anemia.  Add multivitamin daily.  4.  Elevated alkaline phosphatase level, likely due to fatty liver disease.  Need to rule out other pathology if persists.  Obtain right upper quadrant sonogram.  Recommend alcohol cessation and exercise and diet control.  5.  Obesity, recommend exercise and diet control.  6.  Alcohol usage, recommend cessation.  Diagnoses and all orders for this visit:    1. History of gastric bypass (Primary)  -     Case Request; Standing  -     Cancel: sodium chloride 0.9 % infusion 40 mL  -     Cancel: dextrose 5 % and sodium chloride 0.45 % infusion  -     Case Request    2. Other iron deficiency anemia  -     Case Request; Standing  -     Cancel: sodium  chloride 0.9 % infusion 40 mL  -     Cancel: dextrose 5 % and sodium chloride 0.45 % infusion  -     Case Request    Other orders  -     Follow Anesthesia Guidelines / Protocol; Future  -     Obtain Informed Consent; Future  -     Cancel: Implement Anesthesia Orders Day of Procedure; Standing  -     Cancel: Obtain Informed Consent; Standing  -     Cancel: POC Glucose Once; Standing  -     Cancel: Insert Peripheral IV; Standing        ESOPHAGOGASTRODUODENOSCOPY (N/A), COLONOSCOPY (N/A)     Diagnosis Plan   1. History of gastric bypass  Case Request    Case Request      2. Other iron deficiency anemia  Case Request    Case Request          Anticipated Surgical Procedure:  Orders Placed This Encounter   Procedures   • Obtain Informed Consent     Standing Status:   Future     Order Specific Question:   Informed Consent Given For     Answer:   egd and colonoscopy       The risks, benefits, and alternatives of this procedure have been discussed with the patient or the responsible party- the patient understands and agrees to proceed.

## 2023-01-17 NOTE — PROGRESS NOTES
[facility]      Chief Complaint:   Chief Complaint   Patient presents with   • Anemia       Subjective     HPI:   Mr. Strauss is a 70-year-old  male with past medical history of hyperlipidemia, seasonal allergies, gastric bypass presenting for evaluation for anemia.  He has new onset anemia with microcytosis.  Most recent MCV was 69.  Currently receiving iron supplements.  He underwent gastric bypass in 2007.  He has fatigue and intermittent diarrhea and denied abdominal pain, nausea, vomiting, constipation, rectal bleeding or weight loss.  His most recent alk phos was 140 and LFTs are normal otherwise.    Past Medical History:   Past Medical History:   Diagnosis Date   • Hyperlipidemia    • Seasonal allergies        Past Surgical History:  Past Surgical History:   Procedure Laterality Date   • GASTRIC BYPASS         Family History:  History reviewed. No pertinent family history.    Social History:   reports that he has never smoked. He has never been exposed to tobacco smoke. He has never used smokeless tobacco. He reports current alcohol use of about 2.0 standard drinks per week. He reports that he does not use drugs.    Medications:   Prior to Admission medications    Medication Sig Start Date End Date Taking? Authorizing Provider   cetirizine (zyrTEC) 10 MG tablet Take 10 mg by mouth Daily.   Yes Sanford Bedoya MD   ferrous sulfate 325 (65 FE) MG tablet Take 325 mg by mouth Daily With Breakfast.   Yes Sanford Bedoya MD   folic acid (FOLVITE) 1 MG tablet Take 1 tablet by mouth Daily. 1/11/23  Yes Awais Malave MD   LISINOPRIL PO Take 40 mg by mouth Daily.   Yes Sanford Bedoya MD   metoprolol tartrate (LOPRESSOR) 50 MG tablet Take 50 mg by mouth 2 (Two) Times a Day. 11/10/22  Yes Sanford Bedoya MD   multivitamin with minerals tablet tablet Take 1 tablet by mouth Daily.   Yes Sanford Bedoya MD   vitamin B-12 (CYANOCOBALAMIN) 1000 MCG tablet Take 1  "tablet by mouth Daily. 1/11/23  Yes Awais Malave MD   vitamin C (ASCORBIC ACID) 250 MG tablet Take 250 mg by mouth Daily.   Yes Provider, MD Sanford       Allergies:  Patient has no known allergies.    ROS:    Review of Systems   Constitutional: Positive for fatigue. Negative for chills, fever and unexpected weight change.   HENT: Negative for congestion, ear discharge, hearing loss, nosebleeds and sore throat.    Eyes: Negative for pain, discharge and redness.   Respiratory: Negative for cough, chest tightness, shortness of breath and wheezing.    Cardiovascular: Negative for chest pain and palpitations.   Gastrointestinal: Positive for diarrhea. Negative for abdominal distention, abdominal pain, blood in stool, constipation, nausea and vomiting.   Endocrine: Negative for cold intolerance, polydipsia, polyphagia and polyuria.   Genitourinary: Negative for dysuria, flank pain, frequency, hematuria and urgency.   Musculoskeletal: Negative for arthralgias, back pain, joint swelling and myalgias.   Skin: Negative for color change, pallor and rash.   Neurological: Negative for tremors, seizures, syncope, weakness and headaches.   Hematological: Negative for adenopathy. Does not bruise/bleed easily.   Psychiatric/Behavioral: Negative for behavioral problems, confusion, dysphoric mood, hallucinations and suicidal ideas. The patient is not nervous/anxious.      Objective     /79 (BP Location: Left arm)   Pulse 59   Ht 180.3 cm (71\")   Wt 106 kg (232 lb 9.6 oz)   BMI 32.44 kg/m²     Physical Exam  Constitutional:       Appearance: He is well-developed.   HENT:      Head: Normocephalic and atraumatic.   Eyes:      Conjunctiva/sclera: Conjunctivae normal.      Pupils: Pupils are equal, round, and reactive to light.   Neck:      Thyroid: No thyromegaly.   Cardiovascular:      Rate and Rhythm: Normal rate and regular rhythm.      Heart sounds: Normal heart sounds. No murmur heard.  Pulmonary:      Effort: " Pulmonary effort is normal.      Breath sounds: Normal breath sounds. No wheezing.   Abdominal:      General: Bowel sounds are normal. There is no distension.      Palpations: Abdomen is soft. There is no mass.      Tenderness: There is no abdominal tenderness.      Hernia: No hernia is present.   Genitourinary:     Comments: No lesions noted  Musculoskeletal:         General: No tenderness. Normal range of motion.      Cervical back: Normal range of motion and neck supple.   Lymphadenopathy:      Cervical: No cervical adenopathy.   Skin:     General: Skin is warm and dry.      Findings: No rash.   Neurological:      Mental Status: He is alert and oriented to person, place, and time.      Cranial Nerves: No cranial nerve deficit.   Psychiatric:         Thought Content: Thought content normal.        Extremities: No edema, cyanosis or clubbing.    Assessment & Plan    1.  Microcytic anemia with iron deficiency likely due to gastric bypass and need to rule out GI blood loss.  Continue iron supplements.  Proceed with EGD and colonoscopy for further evaluation.  Follow H&H closely.  2.  Diarrhea, likely due to gastric bypass.  Need to rule out IBD, celiac sprue and colorectal neoplasia.  Add Imodium as needed.  Proceed with colonoscopy for further evaluation.  3.  Fatigue, likely due to anemia.  Add multivitamin daily.  4.  Elevated alkaline phosphatase level, likely due to fatty liver disease.  Need to rule out other pathology if persists.  Obtain right upper quadrant sonogram.  Recommend alcohol cessation and exercise and diet control.  5.  Obesity, recommend exercise and diet control.  6.  Alcohol usage, recommend cessation.  Diagnoses and all orders for this visit:    1. History of gastric bypass (Primary)  -     Case Request; Standing  -     Cancel: sodium chloride 0.9 % infusion 40 mL  -     Cancel: dextrose 5 % and sodium chloride 0.45 % infusion  -     Case Request    2. Other iron deficiency anemia  -     Case  Request; Standing  -     Cancel: sodium chloride 0.9 % infusion 40 mL  -     Cancel: dextrose 5 % and sodium chloride 0.45 % infusion  -     Case Request    Other orders  -     Follow Anesthesia Guidelines / Protocol; Future  -     Obtain Informed Consent; Future  -     Cancel: Implement Anesthesia Orders Day of Procedure; Standing  -     Cancel: Obtain Informed Consent; Standing  -     Cancel: POC Glucose Once; Standing  -     Cancel: Insert Peripheral IV; Standing        ESOPHAGOGASTRODUODENOSCOPY (N/A), COLONOSCOPY (N/A)     Diagnosis Plan   1. History of gastric bypass  Case Request    Case Request      2. Other iron deficiency anemia  Case Request    Case Request          Anticipated Surgical Procedure:  Orders Placed This Encounter   Procedures   • Obtain Informed Consent     Standing Status:   Future     Order Specific Question:   Informed Consent Given For     Answer:   egd and colonoscopy       The risks, benefits, and alternatives of this procedure have been discussed with the patient or the responsible party- the patient understands and agrees to proceed.            This document has been electronically signed by Carlton Olson MD on January 17, 2023 13:08 CST

## 2023-01-17 NOTE — ANESTHESIA PREPROCEDURE EVALUATION
Anesthesia Evaluation     Patient summary reviewed and Nursing notes reviewed   NPO Solid Status: > 8 hours  NPO Liquid Status: > 4 hours           Airway   Mallampati: II  TM distance: >3 FB  Neck ROM: full  No difficulty expected  Dental - normal exam     Pulmonary - normal exam   Cardiovascular - normal exam    Patient on routine beta blocker    (+) hypertension well controlled 2 medications or greater, hyperlipidemia,       Neuro/Psych  GI/Hepatic/Renal/Endo    (+) obesity,       ROS Comment: Gastric bypass    Musculoskeletal (-) negative ROS    Abdominal  - normal exam   Substance History - negative use     OB/GYN          Other - negative ROS                       Anesthesia Plan    ASA 2     general     intravenous induction     Anesthetic plan, risks, benefits, and alternatives have been provided, discussed and informed consent has been obtained with: patient.        CODE STATUS:

## 2023-01-18 LAB — REF LAB TEST METHOD: NORMAL

## 2023-01-19 ENCOUNTER — INFUSION (OUTPATIENT)
Dept: ONCOLOGY | Facility: HOSPITAL | Age: 63
End: 2023-01-19
Payer: COMMERCIAL

## 2023-01-19 VITALS
DIASTOLIC BLOOD PRESSURE: 73 MMHG | OXYGEN SATURATION: 100 % | SYSTOLIC BLOOD PRESSURE: 161 MMHG | TEMPERATURE: 96.7 F | RESPIRATION RATE: 18 BRPM | HEART RATE: 57 BPM

## 2023-01-19 DIAGNOSIS — D50.0 IRON DEFICIENCY ANEMIA DUE TO CHRONIC BLOOD LOSS: ICD-10-CM

## 2023-01-19 DIAGNOSIS — Z98.84 HISTORY OF GASTRIC BYPASS: ICD-10-CM

## 2023-01-19 DIAGNOSIS — K90.9 IRON MALABSORPTION: Primary | ICD-10-CM

## 2023-01-19 PROCEDURE — 63710000001 DIPHENHYDRAMINE PER 50 MG: Performed by: INTERNAL MEDICINE

## 2023-01-19 PROCEDURE — 96365 THER/PROPH/DIAG IV INF INIT: CPT | Performed by: INTERNAL MEDICINE

## 2023-01-19 PROCEDURE — 25010000002 IRON SUCROSE PER 1 MG: Performed by: INTERNAL MEDICINE

## 2023-01-19 RX ORDER — DIPHENHYDRAMINE HYDROCHLORIDE 50 MG/ML
50 INJECTION INTRAMUSCULAR; INTRAVENOUS AS NEEDED
Status: DISCONTINUED | OUTPATIENT
Start: 2023-01-19 | End: 2023-01-19 | Stop reason: HOSPADM

## 2023-01-19 RX ORDER — FAMOTIDINE 10 MG/ML
20 INJECTION, SOLUTION INTRAVENOUS AS NEEDED
Status: CANCELLED | OUTPATIENT
Start: 2023-01-21

## 2023-01-19 RX ORDER — DIPHENHYDRAMINE HCL 25 MG
25 CAPSULE ORAL ONCE
Status: COMPLETED | OUTPATIENT
Start: 2023-01-19 | End: 2023-01-19

## 2023-01-19 RX ORDER — FAMOTIDINE 10 MG/ML
20 INJECTION, SOLUTION INTRAVENOUS AS NEEDED
Status: DISCONTINUED | OUTPATIENT
Start: 2023-01-19 | End: 2023-01-19 | Stop reason: HOSPADM

## 2023-01-19 RX ORDER — ACETAMINOPHEN 325 MG/1
650 TABLET ORAL ONCE
Status: COMPLETED | OUTPATIENT
Start: 2023-01-19 | End: 2023-01-19

## 2023-01-19 RX ORDER — FAMOTIDINE 20 MG/1
20 TABLET, FILM COATED ORAL ONCE
Status: COMPLETED | OUTPATIENT
Start: 2023-01-19 | End: 2023-01-19

## 2023-01-19 RX ORDER — DIPHENHYDRAMINE HCL 25 MG
25 CAPSULE ORAL ONCE
Status: CANCELLED | OUTPATIENT
Start: 2023-01-21

## 2023-01-19 RX ORDER — SODIUM CHLORIDE 9 MG/ML
250 INJECTION, SOLUTION INTRAVENOUS ONCE
Status: COMPLETED | OUTPATIENT
Start: 2023-01-19 | End: 2023-01-19

## 2023-01-19 RX ORDER — FAMOTIDINE 20 MG/1
20 TABLET, FILM COATED ORAL ONCE
Status: CANCELLED | OUTPATIENT
Start: 2023-01-21

## 2023-01-19 RX ORDER — SODIUM CHLORIDE 9 MG/ML
250 INJECTION, SOLUTION INTRAVENOUS ONCE
Status: CANCELLED | OUTPATIENT
Start: 2023-01-21

## 2023-01-19 RX ORDER — ACETAMINOPHEN 325 MG/1
650 TABLET ORAL ONCE
Status: CANCELLED | OUTPATIENT
Start: 2023-01-21

## 2023-01-19 RX ORDER — DIPHENHYDRAMINE HYDROCHLORIDE 50 MG/ML
50 INJECTION INTRAMUSCULAR; INTRAVENOUS AS NEEDED
Status: CANCELLED | OUTPATIENT
Start: 2023-01-21

## 2023-01-19 RX ADMIN — FAMOTIDINE 20 MG: 20 TABLET ORAL at 07:40

## 2023-01-19 RX ADMIN — ACETAMINOPHEN 650 MG: 325 TABLET, FILM COATED ORAL at 07:40

## 2023-01-19 RX ADMIN — DIPHENHYDRAMINE HYDROCHLORIDE 25 MG: 25 CAPSULE ORAL at 07:40

## 2023-01-19 RX ADMIN — IRON SUCROSE 200 MG: 20 INJECTION, SOLUTION INTRAVENOUS at 08:10

## 2023-01-19 RX ADMIN — SODIUM CHLORIDE 250 ML: 9 INJECTION, SOLUTION INTRAVENOUS at 07:40

## 2023-01-23 ENCOUNTER — INFUSION (OUTPATIENT)
Dept: ONCOLOGY | Facility: HOSPITAL | Age: 63
End: 2023-01-23
Payer: COMMERCIAL

## 2023-01-23 VITALS
TEMPERATURE: 97.5 F | RESPIRATION RATE: 18 BRPM | SYSTOLIC BLOOD PRESSURE: 151 MMHG | HEART RATE: 52 BPM | DIASTOLIC BLOOD PRESSURE: 69 MMHG

## 2023-01-23 DIAGNOSIS — Z98.84 HISTORY OF GASTRIC BYPASS: ICD-10-CM

## 2023-01-23 DIAGNOSIS — K90.9 IRON MALABSORPTION: Primary | ICD-10-CM

## 2023-01-23 DIAGNOSIS — D50.0 IRON DEFICIENCY ANEMIA DUE TO CHRONIC BLOOD LOSS: ICD-10-CM

## 2023-01-23 PROCEDURE — 25010000002 IRON SUCROSE PER 1 MG: Performed by: INTERNAL MEDICINE

## 2023-01-23 PROCEDURE — 63710000001 DIPHENHYDRAMINE PER 50 MG: Performed by: INTERNAL MEDICINE

## 2023-01-23 PROCEDURE — 96365 THER/PROPH/DIAG IV INF INIT: CPT | Performed by: NURSE PRACTITIONER

## 2023-01-23 RX ORDER — FAMOTIDINE 10 MG/ML
20 INJECTION, SOLUTION INTRAVENOUS AS NEEDED
Status: DISCONTINUED | OUTPATIENT
Start: 2023-01-23 | End: 2023-01-23 | Stop reason: HOSPADM

## 2023-01-23 RX ORDER — FAMOTIDINE 10 MG/ML
20 INJECTION, SOLUTION INTRAVENOUS AS NEEDED
Status: CANCELLED | OUTPATIENT
Start: 2023-01-25

## 2023-01-23 RX ORDER — DIPHENHYDRAMINE HCL 25 MG
25 CAPSULE ORAL ONCE
Status: COMPLETED | OUTPATIENT
Start: 2023-01-23 | End: 2023-01-23

## 2023-01-23 RX ORDER — FAMOTIDINE 20 MG/1
20 TABLET, FILM COATED ORAL ONCE
Status: COMPLETED | OUTPATIENT
Start: 2023-01-23 | End: 2023-01-23

## 2023-01-23 RX ORDER — DIPHENHYDRAMINE HYDROCHLORIDE 50 MG/ML
50 INJECTION INTRAMUSCULAR; INTRAVENOUS AS NEEDED
Status: CANCELLED | OUTPATIENT
Start: 2023-01-25

## 2023-01-23 RX ORDER — FAMOTIDINE 20 MG/1
20 TABLET, FILM COATED ORAL ONCE
Status: CANCELLED | OUTPATIENT
Start: 2023-01-25

## 2023-01-23 RX ORDER — ACETAMINOPHEN 325 MG/1
650 TABLET ORAL ONCE
Status: COMPLETED | OUTPATIENT
Start: 2023-01-23 | End: 2023-01-23

## 2023-01-23 RX ORDER — SODIUM CHLORIDE 9 MG/ML
250 INJECTION, SOLUTION INTRAVENOUS ONCE
Status: CANCELLED | OUTPATIENT
Start: 2023-01-25

## 2023-01-23 RX ORDER — ACETAMINOPHEN 325 MG/1
650 TABLET ORAL ONCE
Status: CANCELLED | OUTPATIENT
Start: 2023-01-25

## 2023-01-23 RX ORDER — SODIUM CHLORIDE 9 MG/ML
250 INJECTION, SOLUTION INTRAVENOUS ONCE
Status: COMPLETED | OUTPATIENT
Start: 2023-01-23 | End: 2023-01-23

## 2023-01-23 RX ORDER — DIPHENHYDRAMINE HCL 25 MG
25 CAPSULE ORAL ONCE
Status: CANCELLED | OUTPATIENT
Start: 2023-01-25

## 2023-01-23 RX ORDER — DIPHENHYDRAMINE HYDROCHLORIDE 50 MG/ML
50 INJECTION INTRAMUSCULAR; INTRAVENOUS AS NEEDED
Status: DISCONTINUED | OUTPATIENT
Start: 2023-01-23 | End: 2023-01-23 | Stop reason: HOSPADM

## 2023-01-23 RX ADMIN — FAMOTIDINE 20 MG: 20 TABLET, FILM COATED ORAL at 07:39

## 2023-01-23 RX ADMIN — DIPHENHYDRAMINE HYDROCHLORIDE 25 MG: 25 CAPSULE ORAL at 07:39

## 2023-01-23 RX ADMIN — ACETAMINOPHEN 650 MG: 325 TABLET, FILM COATED ORAL at 07:39

## 2023-01-23 RX ADMIN — SODIUM CHLORIDE 250 ML: 9 INJECTION, SOLUTION INTRAVENOUS at 07:39

## 2023-01-23 RX ADMIN — IRON SUCROSE 200 MG: 20 INJECTION, SOLUTION INTRAVENOUS at 08:20

## 2023-01-24 ENCOUNTER — OFFICE VISIT (OUTPATIENT)
Dept: GASTROENTEROLOGY | Facility: CLINIC | Age: 63
End: 2023-01-24
Payer: COMMERCIAL

## 2023-01-24 VITALS
HEART RATE: 56 BPM | DIASTOLIC BLOOD PRESSURE: 89 MMHG | BODY MASS INDEX: 33.39 KG/M2 | SYSTOLIC BLOOD PRESSURE: 156 MMHG | HEIGHT: 70 IN | WEIGHT: 233.2 LBS

## 2023-01-24 DIAGNOSIS — Z98.84 HISTORY OF GASTRIC BYPASS: Primary | ICD-10-CM

## 2023-01-24 DIAGNOSIS — R79.89 ELEVATED LFTS: ICD-10-CM

## 2023-01-24 DIAGNOSIS — D50.8 OTHER IRON DEFICIENCY ANEMIA: ICD-10-CM

## 2023-01-24 PROCEDURE — 99214 OFFICE O/P EST MOD 30 MIN: CPT | Performed by: INTERNAL MEDICINE

## 2023-01-26 ENCOUNTER — INFUSION (OUTPATIENT)
Dept: ONCOLOGY | Facility: HOSPITAL | Age: 63
End: 2023-01-26
Payer: COMMERCIAL

## 2023-01-26 VITALS — SYSTOLIC BLOOD PRESSURE: 174 MMHG | DIASTOLIC BLOOD PRESSURE: 74 MMHG | RESPIRATION RATE: 18 BRPM | TEMPERATURE: 97.1 F

## 2023-01-26 DIAGNOSIS — D50.0 IRON DEFICIENCY ANEMIA DUE TO CHRONIC BLOOD LOSS: ICD-10-CM

## 2023-01-26 DIAGNOSIS — Z98.84 HISTORY OF GASTRIC BYPASS: ICD-10-CM

## 2023-01-26 DIAGNOSIS — K90.9 IRON MALABSORPTION: Primary | ICD-10-CM

## 2023-01-26 PROCEDURE — 96365 THER/PROPH/DIAG IV INF INIT: CPT | Performed by: INTERNAL MEDICINE

## 2023-01-26 PROCEDURE — 25010000002 IRON SUCROSE PER 1 MG: Performed by: INTERNAL MEDICINE

## 2023-01-26 PROCEDURE — 63710000001 DIPHENHYDRAMINE PER 50 MG: Performed by: INTERNAL MEDICINE

## 2023-01-26 RX ORDER — ACETAMINOPHEN 325 MG/1
650 TABLET ORAL ONCE
Status: CANCELLED | OUTPATIENT
Start: 2023-01-27

## 2023-01-26 RX ORDER — FAMOTIDINE 10 MG/ML
20 INJECTION, SOLUTION INTRAVENOUS AS NEEDED
Status: DISCONTINUED | OUTPATIENT
Start: 2023-01-26 | End: 2023-01-26 | Stop reason: HOSPADM

## 2023-01-26 RX ORDER — ACETAMINOPHEN 325 MG/1
650 TABLET ORAL ONCE
Status: COMPLETED | OUTPATIENT
Start: 2023-01-26 | End: 2023-01-26

## 2023-01-26 RX ORDER — SODIUM CHLORIDE 9 MG/ML
250 INJECTION, SOLUTION INTRAVENOUS ONCE
Status: CANCELLED | OUTPATIENT
Start: 2023-01-27

## 2023-01-26 RX ORDER — DIPHENHYDRAMINE HCL 25 MG
25 CAPSULE ORAL ONCE
Status: COMPLETED | OUTPATIENT
Start: 2023-01-26 | End: 2023-01-26

## 2023-01-26 RX ORDER — DIPHENHYDRAMINE HCL 25 MG
25 CAPSULE ORAL ONCE
Status: CANCELLED | OUTPATIENT
Start: 2023-01-27

## 2023-01-26 RX ORDER — DIPHENHYDRAMINE HYDROCHLORIDE 50 MG/ML
50 INJECTION INTRAMUSCULAR; INTRAVENOUS AS NEEDED
Status: DISCONTINUED | OUTPATIENT
Start: 2023-01-26 | End: 2023-01-26 | Stop reason: HOSPADM

## 2023-01-26 RX ORDER — FAMOTIDINE 20 MG/1
20 TABLET, FILM COATED ORAL ONCE
Status: COMPLETED | OUTPATIENT
Start: 2023-01-26 | End: 2023-01-26

## 2023-01-26 RX ORDER — FAMOTIDINE 10 MG/ML
20 INJECTION, SOLUTION INTRAVENOUS AS NEEDED
Status: CANCELLED | OUTPATIENT
Start: 2023-01-27

## 2023-01-26 RX ORDER — FAMOTIDINE 20 MG/1
20 TABLET, FILM COATED ORAL ONCE
Status: CANCELLED | OUTPATIENT
Start: 2023-01-27

## 2023-01-26 RX ORDER — DIPHENHYDRAMINE HYDROCHLORIDE 50 MG/ML
50 INJECTION INTRAMUSCULAR; INTRAVENOUS AS NEEDED
Status: CANCELLED | OUTPATIENT
Start: 2023-01-27

## 2023-01-26 RX ORDER — SODIUM CHLORIDE 9 MG/ML
250 INJECTION, SOLUTION INTRAVENOUS ONCE
Status: COMPLETED | OUTPATIENT
Start: 2023-01-26 | End: 2023-01-26

## 2023-01-26 RX ADMIN — ACETAMINOPHEN 650 MG: 325 TABLET, FILM COATED ORAL at 13:46

## 2023-01-26 RX ADMIN — IRON SUCROSE 200 MG: 20 INJECTION, SOLUTION INTRAVENOUS at 14:16

## 2023-01-26 RX ADMIN — DIPHENHYDRAMINE HYDROCHLORIDE 25 MG: 25 CAPSULE ORAL at 13:46

## 2023-01-26 RX ADMIN — FAMOTIDINE 20 MG: 20 TABLET, FILM COATED ORAL at 13:46

## 2023-01-26 RX ADMIN — SODIUM CHLORIDE 250 ML: 9 INJECTION, SOLUTION INTRAVENOUS at 14:16

## 2023-01-30 ENCOUNTER — INFUSION (OUTPATIENT)
Dept: ONCOLOGY | Facility: HOSPITAL | Age: 63
End: 2023-01-30
Payer: COMMERCIAL

## 2023-01-30 VITALS
DIASTOLIC BLOOD PRESSURE: 92 MMHG | HEART RATE: 59 BPM | SYSTOLIC BLOOD PRESSURE: 174 MMHG | TEMPERATURE: 97.1 F | RESPIRATION RATE: 18 BRPM

## 2023-01-30 DIAGNOSIS — Z98.84 HISTORY OF GASTRIC BYPASS: ICD-10-CM

## 2023-01-30 DIAGNOSIS — D50.0 IRON DEFICIENCY ANEMIA DUE TO CHRONIC BLOOD LOSS: ICD-10-CM

## 2023-01-30 DIAGNOSIS — K90.9 IRON MALABSORPTION: Primary | ICD-10-CM

## 2023-01-30 PROCEDURE — 25010000002 IRON SUCROSE PER 1 MG: Performed by: INTERNAL MEDICINE

## 2023-01-30 PROCEDURE — 63710000001 DIPHENHYDRAMINE PER 50 MG: Performed by: INTERNAL MEDICINE

## 2023-01-30 PROCEDURE — 96365 THER/PROPH/DIAG IV INF INIT: CPT | Performed by: INTERNAL MEDICINE

## 2023-01-30 RX ORDER — SODIUM CHLORIDE 9 MG/ML
250 INJECTION, SOLUTION INTRAVENOUS ONCE
Status: CANCELLED | OUTPATIENT
Start: 2023-01-31

## 2023-01-30 RX ORDER — ACETAMINOPHEN 325 MG/1
650 TABLET ORAL ONCE
Status: CANCELLED | OUTPATIENT
Start: 2023-01-31

## 2023-01-30 RX ORDER — FAMOTIDINE 10 MG/ML
20 INJECTION, SOLUTION INTRAVENOUS AS NEEDED
Status: DISCONTINUED | OUTPATIENT
Start: 2023-01-30 | End: 2023-01-30 | Stop reason: HOSPADM

## 2023-01-30 RX ORDER — DIPHENHYDRAMINE HCL 25 MG
25 CAPSULE ORAL ONCE
Status: CANCELLED | OUTPATIENT
Start: 2023-01-31

## 2023-01-30 RX ORDER — FAMOTIDINE 20 MG/1
20 TABLET, FILM COATED ORAL ONCE
Status: COMPLETED | OUTPATIENT
Start: 2023-01-30 | End: 2023-01-30

## 2023-01-30 RX ORDER — DIPHENHYDRAMINE HYDROCHLORIDE 50 MG/ML
50 INJECTION INTRAMUSCULAR; INTRAVENOUS AS NEEDED
Status: CANCELLED | OUTPATIENT
Start: 2023-01-31

## 2023-01-30 RX ORDER — FAMOTIDINE 20 MG/1
20 TABLET, FILM COATED ORAL ONCE
Status: CANCELLED | OUTPATIENT
Start: 2023-01-31

## 2023-01-30 RX ORDER — SODIUM CHLORIDE 9 MG/ML
250 INJECTION, SOLUTION INTRAVENOUS ONCE
Status: COMPLETED | OUTPATIENT
Start: 2023-01-30 | End: 2023-01-30

## 2023-01-30 RX ORDER — FAMOTIDINE 10 MG/ML
20 INJECTION, SOLUTION INTRAVENOUS AS NEEDED
Status: CANCELLED | OUTPATIENT
Start: 2023-01-31

## 2023-01-30 RX ORDER — ACETAMINOPHEN 325 MG/1
650 TABLET ORAL ONCE
Status: COMPLETED | OUTPATIENT
Start: 2023-01-30 | End: 2023-01-30

## 2023-01-30 RX ORDER — DIPHENHYDRAMINE HYDROCHLORIDE 50 MG/ML
50 INJECTION INTRAMUSCULAR; INTRAVENOUS AS NEEDED
Status: DISCONTINUED | OUTPATIENT
Start: 2023-01-30 | End: 2023-01-30 | Stop reason: HOSPADM

## 2023-01-30 RX ORDER — DIPHENHYDRAMINE HCL 25 MG
25 CAPSULE ORAL ONCE
Status: COMPLETED | OUTPATIENT
Start: 2023-01-30 | End: 2023-01-30

## 2023-01-30 RX ADMIN — SODIUM CHLORIDE 250 ML: 9 INJECTION, SOLUTION INTRAVENOUS at 15:02

## 2023-01-30 RX ADMIN — DIPHENHYDRAMINE HYDROCHLORIDE 25 MG: 25 CAPSULE ORAL at 14:24

## 2023-01-30 RX ADMIN — IRON SUCROSE 200 MG: 20 INJECTION, SOLUTION INTRAVENOUS at 15:03

## 2023-01-30 RX ADMIN — FAMOTIDINE 20 MG: 20 TABLET ORAL at 14:25

## 2023-01-30 RX ADMIN — ACETAMINOPHEN 650 MG: 325 TABLET ORAL at 14:24

## 2023-02-01 ENCOUNTER — INFUSION (OUTPATIENT)
Dept: ONCOLOGY | Facility: HOSPITAL | Age: 63
End: 2023-02-01
Payer: COMMERCIAL

## 2023-02-01 VITALS — DIASTOLIC BLOOD PRESSURE: 91 MMHG | HEART RATE: 45 BPM | SYSTOLIC BLOOD PRESSURE: 151 MMHG

## 2023-02-01 DIAGNOSIS — Z98.84 HISTORY OF GASTRIC BYPASS: ICD-10-CM

## 2023-02-01 DIAGNOSIS — D50.0 IRON DEFICIENCY ANEMIA DUE TO CHRONIC BLOOD LOSS: ICD-10-CM

## 2023-02-01 DIAGNOSIS — K90.9 IRON MALABSORPTION: Primary | ICD-10-CM

## 2023-02-01 PROCEDURE — 96365 THER/PROPH/DIAG IV INF INIT: CPT | Performed by: INTERNAL MEDICINE

## 2023-02-01 PROCEDURE — 63710000001 DIPHENHYDRAMINE PER 50 MG: Performed by: INTERNAL MEDICINE

## 2023-02-01 PROCEDURE — 25010000002 IRON SUCROSE PER 1 MG: Performed by: INTERNAL MEDICINE

## 2023-02-01 RX ORDER — ACETAMINOPHEN 325 MG/1
650 TABLET ORAL ONCE
Status: CANCELLED | OUTPATIENT
Start: 2023-02-02

## 2023-02-01 RX ORDER — FAMOTIDINE 10 MG/ML
20 INJECTION, SOLUTION INTRAVENOUS AS NEEDED
Status: CANCELLED | OUTPATIENT
Start: 2023-02-02

## 2023-02-01 RX ORDER — FAMOTIDINE 10 MG/ML
20 INJECTION, SOLUTION INTRAVENOUS AS NEEDED
Status: DISCONTINUED | OUTPATIENT
Start: 2023-02-01 | End: 2023-02-01 | Stop reason: HOSPADM

## 2023-02-01 RX ORDER — ACETAMINOPHEN 325 MG/1
650 TABLET ORAL ONCE
Status: COMPLETED | OUTPATIENT
Start: 2023-02-01 | End: 2023-02-01

## 2023-02-01 RX ORDER — DIPHENHYDRAMINE HCL 25 MG
25 CAPSULE ORAL ONCE
Status: CANCELLED | OUTPATIENT
Start: 2023-02-02

## 2023-02-01 RX ORDER — SODIUM CHLORIDE 9 MG/ML
250 INJECTION, SOLUTION INTRAVENOUS ONCE
Status: COMPLETED | OUTPATIENT
Start: 2023-02-01 | End: 2023-02-01

## 2023-02-01 RX ORDER — SODIUM CHLORIDE 9 MG/ML
250 INJECTION, SOLUTION INTRAVENOUS ONCE
Status: CANCELLED | OUTPATIENT
Start: 2023-02-02

## 2023-02-01 RX ORDER — FAMOTIDINE 20 MG/1
20 TABLET, FILM COATED ORAL ONCE
Status: CANCELLED | OUTPATIENT
Start: 2023-02-02

## 2023-02-01 RX ORDER — DIPHENHYDRAMINE HYDROCHLORIDE 50 MG/ML
50 INJECTION INTRAMUSCULAR; INTRAVENOUS AS NEEDED
Status: CANCELLED | OUTPATIENT
Start: 2023-02-02

## 2023-02-01 RX ORDER — DIPHENHYDRAMINE HCL 25 MG
25 CAPSULE ORAL ONCE
Status: COMPLETED | OUTPATIENT
Start: 2023-02-01 | End: 2023-02-01

## 2023-02-01 RX ORDER — FAMOTIDINE 20 MG/1
20 TABLET, FILM COATED ORAL ONCE
Status: COMPLETED | OUTPATIENT
Start: 2023-02-01 | End: 2023-02-01

## 2023-02-01 RX ORDER — DIPHENHYDRAMINE HYDROCHLORIDE 50 MG/ML
50 INJECTION INTRAMUSCULAR; INTRAVENOUS AS NEEDED
Status: DISCONTINUED | OUTPATIENT
Start: 2023-02-01 | End: 2023-02-01 | Stop reason: HOSPADM

## 2023-02-01 RX ADMIN — DIPHENHYDRAMINE HYDROCHLORIDE 25 MG: 25 CAPSULE ORAL at 13:48

## 2023-02-01 RX ADMIN — SODIUM CHLORIDE 250 ML: 9 INJECTION, SOLUTION INTRAVENOUS at 14:27

## 2023-02-01 RX ADMIN — ACETAMINOPHEN 650 MG: 325 TABLET, FILM COATED ORAL at 13:48

## 2023-02-01 RX ADMIN — IRON SUCROSE 200 MG: 20 INJECTION, SOLUTION INTRAVENOUS at 14:27

## 2023-02-01 RX ADMIN — FAMOTIDINE 20 MG: 20 TABLET, FILM COATED ORAL at 13:48

## 2023-02-11 NOTE — PROGRESS NOTES
Chief Complaint   Patient presents with   • Follow-up   • Anemia       Subjective    Blas James is a 62 y.o. male.    History of Present Illness  Patient presented to GI clinic for follow-up visit today.  Feels better currently.  Denied abdominal pain, nausea or vomiting.  Bowel movements regular.  Weight is stable.  Had iron infusion this month with last one being yesterday.  Has 3 more scheduled.  EGD was consistent with hiatal hernia, esophagitis and gastric bypass.  Path was consistent with reflux esophagitis and reactive gastropathy.  2.  Colonoscopy was consistent with adenomatous colon polyp and hemorrhoids.       The following portions of the patient's history were reviewed and updated as appropriate:   Past Medical History:   Diagnosis Date   • Hyperlipidemia    • Seasonal allergies      Past Surgical History:   Procedure Laterality Date   • COLONOSCOPY N/A 1/17/2023    Procedure: COLONOSCOPY;  Surgeon: Carlton Olson MD;  Location: [facility] ENDOSCOPY;  Service: Gastroenterology;  Laterality: N/A;   • ENDOSCOPY N/A 1/17/2023    Procedure: ESOPHAGOGASTRODUODENOSCOPY;  Surgeon: Carlton Olson MD;  Location: [facility] ENDOSCOPY;  Service: Gastroenterology;  Laterality: N/A;   • GASTRIC BYPASS       History reviewed. No pertinent family history.    Prior to Admission medications    Medication Sig Start Date End Date Taking? Authorizing Provider   cetirizine (zyrTEC) 10 MG tablet Take 10 mg by mouth Daily.   Yes Sanford Bedoya MD   ferrous sulfate 325 (65 FE) MG tablet Take 325 mg by mouth Daily With Breakfast.   Yes Sanford Bedoya MD   folic acid (FOLVITE) 1 MG tablet Take 1 tablet by mouth Daily. 1/11/23  Yes Awais Malave MD   LISINOPRIL PO Take 40 mg by mouth Daily.   Yes Sanford Bedoya MD   metoprolol tartrate (LOPRESSOR) 50 MG tablet Take 50 mg by mouth 2 (Two) Times a Day. 11/10/22  Yes Sanford Bedoya MD   multivitamin with minerals tablet tablet Take 1 tablet by  "mouth Daily.   Yes ProviderSanford MD   vitamin B-12 (CYANOCOBALAMIN) 1000 MCG tablet Take 1 tablet by mouth Daily. 1/11/23  Yes Awais Malave MD   vitamin C (ASCORBIC ACID) 250 MG tablet Take 250 mg by mouth Daily.   Yes ProviderSanfodr MD     No Known Allergies  Social History     Socioeconomic History   • Marital status:    Tobacco Use   • Smoking status: Never     Passive exposure: Never   • Smokeless tobacco: Never   Vaping Use   • Vaping Use: Never used   Substance and Sexual Activity   • Alcohol use: Yes     Alcohol/week: 2.0 standard drinks     Types: 1 Cans of beer, 1 Shots of liquor per week   • Drug use: Never   • Sexual activity: Defer       Review of Systems  Review of Systems   Constitutional: Negative for chills, fatigue, fever and unexpected weight change.   HENT: Negative for congestion, ear discharge, hearing loss, nosebleeds and sore throat.    Eyes: Negative for pain, discharge and redness.   Respiratory: Negative for cough, chest tightness, shortness of breath and wheezing.    Cardiovascular: Negative for chest pain and palpitations.   Gastrointestinal: Negative for abdominal distention, abdominal pain, blood in stool, constipation, diarrhea, nausea and vomiting.   Endocrine: Negative for cold intolerance, polydipsia, polyphagia and polyuria.   Genitourinary: Negative for dysuria, flank pain, frequency, hematuria and urgency.   Musculoskeletal: Negative for arthralgias, back pain, joint swelling and myalgias.   Skin: Negative for color change, pallor and rash.   Neurological: Negative for tremors, seizures, syncope, weakness and headaches.   Hematological: Negative for adenopathy. Does not bruise/bleed easily.   Psychiatric/Behavioral: Negative for behavioral problems, confusion, dysphoric mood, hallucinations and suicidal ideas. The patient is not nervous/anxious.         /89 (BP Location: Left arm)   Pulse 56   Ht 177.8 cm (70\")   Wt 106 kg (233 lb 3.2 oz)   BMI " 33.46 kg/m²     Objective    Physical Exam  Constitutional:       Appearance: He is well-developed.   HENT:      Head: Normocephalic and atraumatic.   Eyes:      Conjunctiva/sclera: Conjunctivae normal.      Pupils: Pupils are equal, round, and reactive to light.   Neck:      Thyroid: No thyromegaly.   Cardiovascular:      Rate and Rhythm: Normal rate and regular rhythm.      Heart sounds: Normal heart sounds. No murmur heard.  Pulmonary:      Effort: Pulmonary effort is normal.      Breath sounds: Normal breath sounds. No wheezing.   Abdominal:      General: Bowel sounds are normal. There is no distension.      Palpations: Abdomen is soft. There is no mass.      Tenderness: There is no abdominal tenderness.      Hernia: No hernia is present.   Genitourinary:     Comments: No lesions noted  Musculoskeletal:         General: No tenderness. Normal range of motion.      Cervical back: Normal range of motion and neck supple.   Lymphadenopathy:      Cervical: No cervical adenopathy.   Skin:     General: Skin is warm and dry.      Findings: No rash.   Neurological:      Mental Status: He is alert and oriented to person, place, and time.      Cranial Nerves: No cranial nerve deficit.   Psychiatric:         Thought Content: Thought content normal.       Admission on 2023, Discharged on 2023   Component Date Value Ref Range Status   • Reference Lab Report 2023    Final                    Value:[facility]  [address] [place] [address]  Phone: [phone] or [phone]  Fax: [phone]  Jamie Mcdonald M.D., Medical Director    PATIENT NAME                           LABORATORY NO.  [address]  GEORGE DIXON.                [id number]  [id number]                         AGE              SEX  N           CLIENT REF #  [facility]           62      1960      [id number]   [id number]    [place]                       REQUESTING M.D.      "ATTENDING M.D.     COPY TO.  [facility]                 [place] [address]             [facility]  [facility] COLLECTED      DATE RECEIVED      DATE REPORTED  01/17/2023 01/17/2023 01/18/2023    DIAGNOSIS:  A.   SMALL BOWEL, BIOPSY:  Small intestinal mucosa with no significant histopathologic abnormalities  B.   GASTRIC BIOPSY:  Gastric antral type mucosa with reactive (chemical) gastropathy  Negative for intestinal                           metaplasia or dysplasia  No Helicobacter pylori-like organisms seen  C.   GE JUNCTION:  Squamous mucosa with features suggestive of reflux esophagitis (no  eosinophils seen)  Glandular mucosa with mild chronic inflammation  Negative for intestinal metaplasia or dysplasia  Negative for specific microorganisms  D.   CECUM POLYP:  Adenomatous polyp (tubular adenoma)  Negative for high-grade dysplasia    SUSIE    CLINICAL HISTORY:  History of gastric bypass, other iron deficiency anemia    SPECIMENS RECEIVED:  A.  SMALL BOWEL, BIOPSY  B.  GASTRIC BIOPSY  C.  GE JUNCTION  D.  CECUM POLYP    MICROSCOPIC DESCRIPTION:  Tissue blocks are prepared and slides are examined microscopically on all  specimens. See diagnosis for details.    Professional interpretation rendered by Silvano Jay M.D., F.C.A.P. at [email or web address] [facility], [address] [place] [address].    GROSS DESCRIPTION:  A.  The specimen is received in 1 formalin filled container labeled \"small  bowel biopsy\" and consists of multiple                           pieces of tan soft tissue measuring  0.5 x 0.3 x 0.2 cm in aggregate.  The specimen is submitted entirely in 1  cassette.  KAMI  B.  Specimen is received in 1 formalin filled container labeled \"gastric biopsy\"  and consists of 3 pieces of tan soft tissue measuring 0.4 x 0.3 x 0.2 cm in  aggregate.  The specimen is submitted entirely in 1 cassette.  C.  Specimen is received in 1 formalin filled container labeled \"EG " "junction  biopsy\" and consists of multiple pieces of tan soft tissue measuring 0.3 x  0.3 x 0.2 cm in aggregate.  The specimen is submitted entirely in 1  cassette.  D.  Specimen is received in 1 formalin filled container labeled \"cecum polyp\"  and consists of 1 piece of tan soft tissue measuring 0.3 x 0.2 x 0.2 cm.  Specimen is submitted entirely in 1 cassette.    REVIEWED, DIAGNOSED AND ELECTRONICALLY  SIGNED BY:    Silvano Jay M.D., XENA.  CPT CODES:  88305x4       Assessment & Plan    No diagnosis found..   1.  Iron deficiency anemia, likely due to gastric bypass status.  Needs small bowel evaluation if persist.  Continue iron infusions.  Repeat CBC in next visit.  2.  Colon polyps, repeat colonoscopy in 3 years.  3.  Obesity, recommend exercise and diet control.  4.  Elevated alkaline phosphatase level, likely due to fatty liver disease.  Recommend alcohol cessation and repeat LFTs in next visit.  Obtain right upper quadrant sonogram if remains elevated.  5.  Alcohol usage, recommend cessation.    Orders placed during this encounter include:  No orders of the defined types were placed in this encounter.      * Surgery not found *    Review and/or summary of lab tests, radiology, procedures, medications. Review and summary of old records and obtaining of history. The risks and benefits of my recommendations, as well as other treatment options were discussed with the patient today. Questions were answered.    No orders of the defined types were placed in this encounter.      Follow-up: Return in about 2 months (around 3/24/2023).               Results for orders placed or performed during the hospital encounter of 01/17/23   TISSUE EXAM, [facility] ([facility])    Specimen: A: Small Intestine, Duodenum; Tissue    B: Gastric, Body; Tissue    C: Esophagus; Tissue    D: Large Intestine, Cecum; Polyp   Result Value Ref Range    Reference Lab Report       [facility]  [address]    " "[place] [address]  Phone: [phone] or [phone]  Fax: [phone]  Jamie Mcdonald M.D., Medical Director    PATIENT NAME                           LABORATORY NO.  GEORGE HAYNES.                [id number]  [id number]                         AGE              SEX  SSN           CLIENT REF #  [facility]           62      1960      [id number]   [id number]    [place]                       REQUESTING M.D.     ATTENDING MFeliceD.     COPY TO[address]                 SYDNEE KEYS MICHAEL  [address]  DATE COLLECTED      DATE RECEIVED      DATE REPORTED  2023    DIAGNOSIS:  A.   SMALL BOWEL, BIOPSY:  Small intestinal mucosa with no significant histopathologic abnormalities  B.   GASTRIC BIOPSY:  Gastric antral type mucosa with reactive (chemical) gastropathy  Negative for intestinal  metaplasia or dysplasia  No Helicobacter pylori-like organisms seen  C.   GE JUNCTION:  Squamous mucosa with features suggestive of reflux esophagitis (no  eosinophils seen)  Glandular mucosa with mild chronic inflammation  Negative for intestinal metaplasia or dysplasia  Negative for specific microorganisms  D.   CECUM POLYP:  Adenomatous polyp (tubular adenoma)  Negative for high-grade dysplasia    SUSIE    CLINICAL HISTORY:  History of gastric bypass, other iron deficiency anemia    SPECIMENS RECEIVED:  A.  SMALL BOWEL, BIOPSY  B.  GASTRIC BIOPSY  C.  GE JUNCTION  D.  CECUM POLYP    MICROSCOPIC DESCRIPTION:  Tissue blocks are prepared and slides are examined microscopically on all  specimens. See diagnosis for details.    Professional interpretation rendered by Silvano Jay M.D., F.C.A.P. at [employer], [address] [place] [address].    GROSS DESCRIPTION:  A.  The specimen is received in 1 formalin filled container labeled \"small  bowel biopsy\" and consists of multiple  " "pieces of tan soft tissue measuring  0.5 x 0.3 x 0.2 cm in aggregate.  The specimen is submitted entirely in 1  cassette.  KAMI  B.  Specimen is received in 1 formalin filled container labeled \"gastric biopsy\"  and consists of 3 pieces of tan soft tissue measuring 0.4 x 0.3 x 0.2 cm in  aggregate.  The specimen is submitted entirely in 1 cassette.  C.  Specimen is received in 1 formalin filled container labeled \"EG junction  biopsy\" and consists of multiple pieces of tan soft tissue measuring 0.3 x  0.3 x 0.2 cm in aggregate.  The specimen is submitted entirely in 1  cassette.  D.  Specimen is received in 1 formalin filled container labeled \"cecum polyp\"  and consists of 1 piece of tan soft tissue measuring 0.3 x 0.2 x 0.2 cm.  Specimen is submitted entirely in 1 cassette.    REVIEWED, DIAGNOSED AND ELECTRONICALLY  SIGNED BY:    Silvano Jay M.D., XENA.  CPT CODES:  88305x4     Results for orders placed or performed in visit on 01/11/23   CBC Auto Differential    Specimen: Blood   Result Value Ref Range    WBC 8.87 3.40 - 10.80 10*3/mm3    RBC 4.11 (L) 4.14 - 5.80 10*6/mm3    Hemoglobin 7.5 (L) 13.0 - 17.7 g/dL    Hematocrit 28.5 (L) 37.5 - 51.0 %    MCV 69.3 (L) 79.0 - 97.0 fL    MCH 18.2 (L) 26.6 - 33.0 pg    MCHC 26.3 (L) 31.5 - 35.7 g/dL    RDW 22.9 (H) 12.3 - 15.4 %    RDW-SD 55.8 (H) 37.0 - 54.0 fl    MPV 9.8 6.0 - 12.0 fL    Platelets 279 140 - 450 10*3/mm3    Neutrophil % 58.0 42.7 - 76.0 %    Lymphocyte % 31.8 19.6 - 45.3 %    Monocyte % 6.4 5.0 - 12.0 %    Eosinophil % 1.8 0.3 - 6.2 %    Basophil % 1.5 0.0 - 1.5 %    Immature Grans % 0.5 0.0 - 0.5 %    Neutrophils, Absolute 5.15 1.70 - 7.00 10*3/mm3    Lymphocytes, Absolute 2.82 0.70 - 3.10 10*3/mm3    Monocytes, Absolute 0.57 0.10 - 0.90 10*3/mm3    Eosinophils, Absolute 0.16 0.00 - 0.40 10*3/mm3    Basophils, Absolute 0.13 0.00 - 0.20 10*3/mm3    Immature Grans, Absolute 0.04 0.00 - 0.05 10*3/mm3    nRBC 0.0 0.0 - 0.2 /100 WBC   Iron and TIBC    " Specimen: Blood   Result Value Ref Range    Iron 13 (L) 59 - 158 mcg/dL    Iron Saturation 2 (L) 20 - 50 %    Transferrin 361 (H) 200 - 360 mg/dL    TIBC 538 (H) 298 - 536 mcg/dL   Ferritin    Specimen: Blood   Result Value Ref Range    Ferritin 5.56 (L) 30.00 - 400.00 ng/mL   Results for orders placed or performed in visit on 01/10/23   POCT FECAL OCCULT BLOOD BY IMMUNOASSAY    Specimen: Stool   Result Value Ref Range    POC OCCULT BLOOD BY IMMUNOASSAY Negative Negative   Results for orders placed or performed in visit on 01/04/23   Iron and TIBC    Specimen: Blood   Result Value Ref Range    Iron 14 (L) 59 - 158 mcg/dL    Iron Saturation 3 (L) 20 - 50 %    Transferrin 335 200 - 360 mg/dL    TIBC 499 298 - 536 mcg/dL   Vitamin D,25-Hydroxy    Specimen: Blood   Result Value Ref Range    25 Hydroxy, Vitamin D 18.4 (L) 30.0 - 100.0 ng/ml   Reticulocytes    Specimen: Blood   Result Value Ref Range    Reticulocyte % 2.07 (H) 0.70 - 1.90 %    Reticulocyte Absolute 0.0799 0.0200 - 0.1300 10*6/mm3   CBC (No Diff)    Specimen: Blood   Result Value Ref Range    WBC 7.64 3.40 - 10.80 10*3/mm3    RBC 3.84 (L) 4.14 - 5.80 10*6/mm3    Hemoglobin 6.9 (C) 13.0 - 17.7 g/dL    Hematocrit 25.6 (L) 37.5 - 51.0 %    MCV 66.7 (L) 79.0 - 97.0 fL    MCH 18.0 (L) 26.6 - 33.0 pg    MCHC 27.0 (L) 31.5 - 35.7 g/dL    RDW 19.8 (H) 12.3 - 15.4 %    RDW-SD 46.5 37.0 - 54.0 fl    Platelets 244 140 - 450 10*3/mm3   TSH    Specimen: Blood   Result Value Ref Range    TSH 2.850 0.270 - 4.200 uIU/mL   Vitamin B6    Specimen: Blood   Result Value Ref Range    Vitamin B6 8.0 3.4 - 65.2 ug/L   PTH, Intact    Specimen: Blood   Result Value Ref Range    PTH, Intact 106.0 (H) 15.0 - 65.0 pg/mL   Folate    Specimen: Blood   Result Value Ref Range    Folate 6.18 4.78 - 24.20 ng/mL   Ferritin    Specimen: Blood   Result Value Ref Range    Ferritin 4.79 (L) 30.00 - 400.00 ng/mL   Vitamin B12    Specimen: Blood   Result Value Ref Range    Vitamin B-12 497 211 -  946 pg/mL         This document has been electronically signed by Carlton Olson MD on February 11, 2023 07:56 CST

## 2023-03-14 DIAGNOSIS — D50.0 IRON DEFICIENCY ANEMIA DUE TO CHRONIC BLOOD LOSS: Primary | ICD-10-CM

## 2023-03-15 ENCOUNTER — OFFICE VISIT (OUTPATIENT)
Dept: ONCOLOGY | Facility: CLINIC | Age: 63
End: 2023-03-15
Payer: COMMERCIAL

## 2023-03-15 ENCOUNTER — LAB (OUTPATIENT)
Dept: ONCOLOGY | Facility: HOSPITAL | Age: 63
End: 2023-03-15
Payer: COMMERCIAL

## 2023-03-15 VITALS
TEMPERATURE: 97.7 F | BODY MASS INDEX: 34.16 KG/M2 | DIASTOLIC BLOOD PRESSURE: 87 MMHG | HEART RATE: 52 BPM | SYSTOLIC BLOOD PRESSURE: 166 MMHG | OXYGEN SATURATION: 97 % | WEIGHT: 238.1 LBS

## 2023-03-15 DIAGNOSIS — Z98.84 HISTORY OF GASTRIC BYPASS: Chronic | ICD-10-CM

## 2023-03-15 DIAGNOSIS — E55.9 VITAMIN D DEFICIENCY: ICD-10-CM

## 2023-03-15 DIAGNOSIS — E21.3 HYPERPARATHYROIDISM: ICD-10-CM

## 2023-03-15 DIAGNOSIS — K90.9 IRON MALABSORPTION: Chronic | ICD-10-CM

## 2023-03-15 DIAGNOSIS — D50.0 IRON DEFICIENCY ANEMIA DUE TO CHRONIC BLOOD LOSS: Primary | Chronic | ICD-10-CM

## 2023-03-15 DIAGNOSIS — D50.0 IRON DEFICIENCY ANEMIA DUE TO CHRONIC BLOOD LOSS: ICD-10-CM

## 2023-03-15 PROBLEM — D50.9 IRON DEFICIENCY ANEMIA: Chronic | Status: ACTIVE | Noted: 2023-01-11

## 2023-03-15 LAB
25(OH)D3 SERPL-MCNC: 27.9 NG/ML (ref 30–100)
BASOPHILS # BLD AUTO: 0.09 10*3/MM3 (ref 0–0.2)
BASOPHILS NFR BLD AUTO: 1.2 % (ref 0–1.5)
DEPRECATED RDW RBC AUTO: 67 FL (ref 37–54)
EOSINOPHIL # BLD AUTO: 0.24 10*3/MM3 (ref 0–0.4)
EOSINOPHIL NFR BLD AUTO: 3.1 % (ref 0.3–6.2)
ERYTHROCYTE [DISTWIDTH] IN BLOOD BY AUTOMATED COUNT: 22.9 % (ref 12.3–15.4)
FERRITIN SERPL-MCNC: 10.84 NG/ML (ref 30–400)
FOLATE SERPL-MCNC: 13.1 NG/ML (ref 4.78–24.2)
HCT VFR BLD AUTO: 38.5 % (ref 37.5–51)
HGB BLD-MCNC: 11.6 G/DL (ref 13–17.7)
IMM GRANULOCYTES # BLD AUTO: 0.02 10*3/MM3 (ref 0–0.05)
IMM GRANULOCYTES NFR BLD AUTO: 0.3 % (ref 0–0.5)
IRON 24H UR-MRATE: 31 MCG/DL (ref 59–158)
IRON SATN MFR SERPL: 6 % (ref 20–50)
LYMPHOCYTES # BLD AUTO: 2.89 10*3/MM3 (ref 0.7–3.1)
LYMPHOCYTES NFR BLD AUTO: 37 % (ref 19.6–45.3)
MCH RBC QN AUTO: 24.4 PG (ref 26.6–33)
MCHC RBC AUTO-ENTMCNC: 30.1 G/DL (ref 31.5–35.7)
MCV RBC AUTO: 80.9 FL (ref 79–97)
MONOCYTES # BLD AUTO: 0.58 10*3/MM3 (ref 0.1–0.9)
MONOCYTES NFR BLD AUTO: 7.4 % (ref 5–12)
NEUTROPHILS NFR BLD AUTO: 4 10*3/MM3 (ref 1.7–7)
NEUTROPHILS NFR BLD AUTO: 51 % (ref 42.7–76)
NRBC BLD AUTO-RTO: 0 /100 WBC (ref 0–0.2)
PLATELET # BLD AUTO: 172 10*3/MM3 (ref 140–450)
PMV BLD AUTO: 9.9 FL (ref 6–12)
PTH-INTACT SERPL-MCNC: 108.6 PG/ML (ref 15–65)
RBC # BLD AUTO: 4.76 10*6/MM3 (ref 4.14–5.8)
TIBC SERPL-MCNC: 481 MCG/DL (ref 298–536)
TRANSFERRIN SERPL-MCNC: 323 MG/DL (ref 200–360)
VIT B12 BLD-MCNC: 795 PG/ML (ref 211–946)
WBC NRBC COR # BLD: 7.82 10*3/MM3 (ref 3.4–10.8)

## 2023-03-15 PROCEDURE — 99214 OFFICE O/P EST MOD 30 MIN: CPT | Performed by: INTERNAL MEDICINE

## 2023-03-15 PROCEDURE — 82607 VITAMIN B-12: CPT

## 2023-03-15 PROCEDURE — 1125F AMNT PAIN NOTED PAIN PRSNT: CPT | Performed by: INTERNAL MEDICINE

## 2023-03-15 PROCEDURE — 82306 VITAMIN D 25 HYDROXY: CPT

## 2023-03-15 PROCEDURE — 83540 ASSAY OF IRON: CPT

## 2023-03-15 PROCEDURE — 84466 ASSAY OF TRANSFERRIN: CPT

## 2023-03-15 PROCEDURE — G0463 HOSPITAL OUTPT CLINIC VISIT: HCPCS | Performed by: INTERNAL MEDICINE

## 2023-03-15 PROCEDURE — 85025 COMPLETE CBC W/AUTO DIFF WBC: CPT

## 2023-03-15 PROCEDURE — 1123F ACP DISCUSS/DSCN MKR DOCD: CPT | Performed by: INTERNAL MEDICINE

## 2023-03-15 PROCEDURE — 82746 ASSAY OF FOLIC ACID SERUM: CPT

## 2023-03-15 PROCEDURE — 82728 ASSAY OF FERRITIN: CPT

## 2023-03-15 PROCEDURE — 83970 ASSAY OF PARATHORMONE: CPT

## 2023-03-15 RX ORDER — FAMOTIDINE 20 MG/1
20 TABLET, FILM COATED ORAL ONCE
OUTPATIENT
Start: 2023-03-22

## 2023-03-15 RX ORDER — FAMOTIDINE 10 MG/ML
20 INJECTION, SOLUTION INTRAVENOUS AS NEEDED
OUTPATIENT
Start: 2023-03-22

## 2023-03-15 RX ORDER — DIPHENHYDRAMINE HYDROCHLORIDE 50 MG/ML
50 INJECTION INTRAMUSCULAR; INTRAVENOUS AS NEEDED
OUTPATIENT
Start: 2023-03-22

## 2023-03-15 RX ORDER — DIPHENHYDRAMINE HCL 25 MG
25 CAPSULE ORAL ONCE
OUTPATIENT
Start: 2023-03-22

## 2023-03-15 RX ORDER — ERGOCALCIFEROL (VITAMIN D2) 10 MCG
400 TABLET ORAL DAILY
COMMUNITY

## 2023-03-15 RX ORDER — ACETAMINOPHEN 325 MG/1
650 TABLET ORAL ONCE
OUTPATIENT
Start: 2023-03-22

## 2023-03-15 RX ORDER — SODIUM CHLORIDE 9 MG/ML
250 INJECTION, SOLUTION INTRAVENOUS ONCE
OUTPATIENT
Start: 2023-03-22

## 2023-03-15 NOTE — PROGRESS NOTES
DATE OF VISIT: 3/16/2023      REASON FOR VISIT: Iron deficiency anemia, history of gastric bypass, iron malabsorption      HISTORY OF PRESENT ILLNESS:   62-year-old male with medical problems significant for hypertension, history of gastric bypass in 2007, chronic back pain and neck pain was initially seen in consultation on January 11, 2022 for iron deficiency anemia.  Due to iron malabsorption patient received intravenous Venofer that completed on February 1, 2023.  Patient is here for follow-up appointment today.  Complains of fatigue.  Denies any new lymph node enlargement.  Complains of intermittent tingling and numbness affecting upper extremity.  Denies any recent fever.            Past Medical History, Past Surgical History, Social History, Family History have been reviewed and are without significant changes except as mentioned.    Review of Systems   A comprehensive 14 point review of systems was performed and was negative except as mentioned in HPI.    Medications:  The current medication list was reviewed in the EMR    ALLERGIES:  No Known Allergies    Objective      Vitals:    03/15/23 1349   BP: 166/87   Pulse: 52   Temp: 97.7 °F (36.5 °C)   TempSrc: Temporal   SpO2: 97%   Weight: 108 kg (238 lb 1.6 oz)   PainSc:   4   PainLoc: Neck  Comment: PINCHED NERVE IN NECK     Current Status 1/30/2023   ECOG score 0       Physical Exam  Pulmonary:      Breath sounds: Normal breath sounds.   Neurological:      Mental Status: He is alert and oriented to person, place, and time.           RECENT LABS:  No results found for: GLUCOSE, NA, K, CO2, CL, ANIONGAP, CREATININE, BUN, BCR, CALCIUM, EGFRIFNONA, ALKPHOS, PROTEINTOT, ALT, AST, BILITOT, ALBUMIN, GLOB, LABIL2  Lab Results   Component Value Date    WBC 7.82 03/15/2023    HGB 11.6 (L) 03/15/2023    HCT 38.5 03/15/2023    MCV 80.9 03/15/2023     03/15/2023     Lab Results   Component Value Date    NEUTROABS 4.00 03/15/2023    IRON 31 (L) 03/15/2023    IRON  13 (L) 2023    IRON 14 (L) 2023    TIBC 481 03/15/2023    TIBC 538 (H) 2023    TIBC 499 2023    LABIRON 6 (L) 03/15/2023    LABIRON 2 (L) 2023    LABIRON 3 (L) 2023    FERRITIN 10.84 (L) 03/15/2023    FERRITIN 5.56 (L) 2023    FERRITIN 4.79 (L) 2023    XVLLJSYT02 795 03/15/2023    NOLEEYBD81 497 2023    FOLATE 13.10 03/15/2023    FOLATE 6.18 2023     Lab Results   Component Value Date    REFLABREPO  2023     [facility]  [address] [place] [address]  Phone: [phone] or [phone]  Fax: [phone]  Jamie Mcdonald M.D., Medical Director    PATIENT NAME                           LABORATORY NO.  1800  GEORGE DIXON.                [id number]  [id number]                         AGE              SEX  SSN           CLIENT REF #  [facility]           62      1960      [id number]   [id number]    [place]                       REQUESTING M.D.     ATTENDING M.D.     COPY TO[address]                 SYDNEE KEYS DEDRICK  [address]  DATE COLLECTED      DATE RECEIVED      DATE REPORTED  2023    DIAGNOSIS:  A.   SMALL BOWEL, BIOPSY:  Small intestinal mucosa with no significant histopathologic abnormalities  B.   GASTRIC BIOPSY:  Gastric antral type mucosa with reactive (chemical) gastropathy  Negative for intestinal metaplasia or dysplasia  No Helicobacter pylori-like organisms seen  C.   GE JUNCTION:  Squamous mucosa with features suggestive of reflux esophagitis (no  eosinophils seen)  Glandular mucosa with mild chronic inflammation  Negative for intestinal metaplasia or dysplasia  Negative for specific microorganisms  D.   CECUM POLYP:  Adenomatous polyp (tubular adenoma)  Negative for high-grade dysplasia    SUSIE    CLINICAL HISTORY:  History of gastric bypass, other iron  "deficiency anemia    SPECIMENS RECEIVED:  A.  SMALL BOWEL, BIOPSY  B.  GASTRIC BIOPSY  C.  GE JUNCTION  D.  CECUM POLYP    MICROSCOPIC DESCRIPTION:  Tissue blocks are prepared and slides are examined microscopically on all  specimens. See diagnosis for details.    Professional interpretation rendered by Silvano Jay M.D., MARY LOU at [employer] [address] [place] [address].    GROSS DESCRIPTION:  A.  The specimen is received in 1 formalin filled container labeled \"small  bowel biopsy\" and consists of multiple pieces of tan soft tissue measuring  0.5 x 0.3 x 0.2 cm in aggregate.  The specimen is submitted entirely in 1  cassette.  KAMI  B.  Specimen is received in 1 formalin filled container labeled \"gastric biopsy\"  and consists of 3 pieces of tan soft tissue measuring 0.4 x 0.3 x 0.2 cm in  aggregate.  The specimen is submitted entirely in 1 cassette.  C.  Specimen is received in 1 formalin filled container labeled \"EG junction  biopsy\" and consists of multiple pieces of tan soft tissue measuring 0.3 x  0.3 x 0.2 cm in aggregate.  The specimen is submitted entirely in 1  cassette.  D.  Specimen is received in 1 formalin filled container labeled \"cecum polyp\"  and consists of 1 piece of tan soft tissue measuring 0.3 x 0.2 x 0.2 cm.  Specimen is submitted entirely in 1 cassette.    REVIEWED, DIAGNOSED AND ELECTRONICALLY  SIGNED BY:    Silvano Jay M.D., TENISHAAMARYSOL.  CPT CODES:  88305x4           PATHOLOGY:  * Cannot find OR log *         RADIOLOGY DATA :  No radiology results for the last 7 days        Assessment & Plan     1.  Iron deficiency anemia:  - Anemia work-up done on January 4, 2023 showed severe iron deficiency with ferritin of only 4.  - Due to iron malabsorption patient received intravenous Venofer that completed on February 1, 2023.  - Patient had EGD and colonoscopy on January 17, 2023 that showed hiatal hernia, esophagitis and gastritis without any bleeding.  Colonoscopy showed 5 mm " polyp without any active bleeding.  - Patient is currently on B12 and folic acid p.o. daily  - Anemia work-up done today shows hemoglobin is 11.6 iron saturation is 6% with ferritin of 10.  Recommend starting intravenous Venofer starting next week due to iron malabsorption.  Recommend capsule endoscopy to complete GI work-up.  Patient has upcoming appointment with gastroenterologist on March 24, 2023.  Case was discussed with Dr Olson  - We will have patient return to clinic in 3 months with repeat CBC, iron studies, ferritin, B12 and folate to be done on that day.    2.  History of gastric bypass    3.  Hypertension    4.  Health maintenance: Patient does not smoke.  Had a colonoscopy in January 2023    5.  Advance care planning:  - CODE STATUS and resuscitation were discussed with patient today.  Patient remains full code.                 PHQ-9 Total Score: 0   -Patient is not homicidal or suicidal.  No acute intervention required.    Blas James reports a pain score of 4.  Given his pain assessment as noted, treatment options were discussed and the following options were decided upon as a follow-up plan to address the patient's pain: continuation of current treatment plan for pain.         Awais Malave MD  3/16/2023  06:51 CDT        Part of this note may be an electronic transcription/translation of spoken language to printed text using the Dragon Dictation System.          CC:

## 2023-03-16 ENCOUNTER — TELEPHONE (OUTPATIENT)
Dept: ONCOLOGY | Facility: CLINIC | Age: 63
End: 2023-03-16
Payer: COMMERCIAL

## 2023-03-16 DIAGNOSIS — E55.9 VITAMIN D DEFICIENCY: ICD-10-CM

## 2023-03-16 DIAGNOSIS — E21.3 HYPERPARATHYROIDISM: Primary | ICD-10-CM

## 2023-03-16 NOTE — TELEPHONE ENCOUNTER
----- Message from Awais Malave MD sent at 3/16/2023  6:52 AM CDT -----  Please let patient know, he needs to continue taking B12 and folic acid p.o. daily.  We will start intravenous iron next week once we get it approved through insurance company.  Thank you

## 2023-03-24 ENCOUNTER — OFFICE VISIT (OUTPATIENT)
Dept: GASTROENTEROLOGY | Facility: CLINIC | Age: 63
End: 2023-03-24
Payer: COMMERCIAL

## 2023-03-24 VITALS
BODY MASS INDEX: 34.36 KG/M2 | DIASTOLIC BLOOD PRESSURE: 81 MMHG | WEIGHT: 240 LBS | HEIGHT: 70 IN | HEART RATE: 46 BPM | SYSTOLIC BLOOD PRESSURE: 170 MMHG

## 2023-03-24 DIAGNOSIS — D50.8 OTHER IRON DEFICIENCY ANEMIA: Primary | ICD-10-CM

## 2023-03-24 PROCEDURE — 99213 OFFICE O/P EST LOW 20 MIN: CPT | Performed by: INTERNAL MEDICINE

## 2023-03-24 RX ORDER — SIMETHICONE 20 MG/.3ML
333 EMULSION ORAL ONCE
OUTPATIENT
Start: 2023-03-24 | End: 2023-03-24

## 2023-03-28 DIAGNOSIS — D50.8 OTHER IRON DEFICIENCY ANEMIA: Primary | ICD-10-CM

## 2023-03-31 ENCOUNTER — INFUSION (OUTPATIENT)
Dept: ONCOLOGY | Facility: HOSPITAL | Age: 63
End: 2023-03-31
Payer: COMMERCIAL

## 2023-03-31 VITALS
HEART RATE: 50 BPM | TEMPERATURE: 97.6 F | SYSTOLIC BLOOD PRESSURE: 192 MMHG | DIASTOLIC BLOOD PRESSURE: 80 MMHG | RESPIRATION RATE: 18 BRPM

## 2023-04-03 ENCOUNTER — OFFICE VISIT (OUTPATIENT)
Dept: FAMILY MEDICINE CLINIC | Facility: CLINIC | Age: 63
End: 2023-04-03
Payer: COMMERCIAL

## 2023-04-03 VITALS
DIASTOLIC BLOOD PRESSURE: 89 MMHG | TEMPERATURE: 97.8 F | HEIGHT: 70 IN | SYSTOLIC BLOOD PRESSURE: 160 MMHG | BODY MASS INDEX: 33.5 KG/M2 | OXYGEN SATURATION: 97 % | WEIGHT: 234 LBS | HEART RATE: 54 BPM

## 2023-04-03 DIAGNOSIS — R00.1 BRADYCARDIA: ICD-10-CM

## 2023-04-03 DIAGNOSIS — E21.3 HYPERPARATHYROIDISM: ICD-10-CM

## 2023-04-03 DIAGNOSIS — I10 HYPERTENSION, UNSPECIFIED TYPE: ICD-10-CM

## 2023-04-03 DIAGNOSIS — R93.89 ABNORMAL X-RAY: Primary | ICD-10-CM

## 2023-04-03 DIAGNOSIS — R53.83 OTHER FATIGUE: ICD-10-CM

## 2023-04-03 PROCEDURE — 99214 OFFICE O/P EST MOD 30 MIN: CPT | Performed by: FAMILY MEDICINE

## 2023-04-03 RX ORDER — NEBIVOLOL 5 MG/1
5 TABLET ORAL DAILY
Qty: 30 TABLET | Refills: 0 | Status: SHIPPED | OUTPATIENT
Start: 2023-04-03

## 2023-04-03 RX ORDER — OLMESARTAN MEDOXOMIL 40 MG/1
40 TABLET ORAL DAILY
Qty: 30 TABLET | Refills: 0 | Status: SHIPPED | OUTPATIENT
Start: 2023-04-03

## 2023-04-03 RX ORDER — LISINOPRIL 40 MG/1
TABLET ORAL
COMMUNITY
Start: 2023-03-07 | End: 2023-04-03 | Stop reason: ALTCHOICE

## 2023-04-03 NOTE — PROGRESS NOTES
" Subjective   Blas David James is a 62 y.o. male.     Chief Complaint   Patient presents with   • BLOOD PRESSURE PROBLEMS             History of Present Illness     bp not good.  He had a iron infusion refused due to high bp.   Also  He is taking long acting caltrate 1500 2 qd and otc vit d 5,000  u daily  He has just recently started this.   Also  He has been quite low energy   Also  He had xr that showed \"eggshell calcium\" of splenic artery, he is worried about the possible aneurysm.       Review of Systems   Constitutional: Negative for chills, fatigue and fever.   HENT: Negative for congestion, ear discharge, ear pain, facial swelling, hearing loss, postnasal drip, rhinorrhea, sinus pressure, sore throat, trouble swallowing and voice change.    Eyes: Negative for discharge, redness and visual disturbance.   Respiratory: Negative for cough, chest tightness, shortness of breath and wheezing.    Cardiovascular: Negative for chest pain and palpitations.   Gastrointestinal: Negative for abdominal pain, blood in stool, constipation, diarrhea, nausea and vomiting.   Endocrine: Negative for polydipsia and polyuria.   Genitourinary: Negative for dysuria, flank pain, hematuria and urgency.   Musculoskeletal: Positive for neck pain. Negative for arthralgias, back pain, joint swelling and myalgias.   Skin: Negative for rash.   Neurological: Positive for headaches. Negative for dizziness, weakness and numbness.   Hematological: Negative for adenopathy.   Psychiatric/Behavioral: Negative for confusion and sleep disturbance. The patient is not nervous/anxious.            /89 (BP Location: Left arm, Patient Position: Sitting, Cuff Size: Adult)   Pulse 54   Temp 97.8 °F (36.6 °C) (Infrared)   Ht 177.8 cm (70\")   Wt 106 kg (234 lb)   SpO2 97%   BMI 33.58 kg/m²       Objective     Physical Exam  Vitals and nursing note reviewed.   Constitutional:       Appearance: Normal appearance. He is well-developed.   HENT: "      Head: Normocephalic and atraumatic.      Right Ear: External ear normal.      Left Ear: External ear normal.      Nose: Nose normal. No rhinorrhea.   Eyes:      General: No scleral icterus.     Extraocular Movements: Extraocular movements intact.      Conjunctiva/sclera: Conjunctivae normal.      Pupils: Pupils are equal, round, and reactive to light.   Cardiovascular:      Rate and Rhythm: Normal rate and regular rhythm.      Heart sounds: Normal heart sounds.     No friction rub. No gallop.   Pulmonary:      Effort: Pulmonary effort is normal.      Breath sounds: Normal breath sounds.   Abdominal:      General: Bowel sounds are normal. There is no distension.      Palpations: Abdomen is soft.      Tenderness: There is no abdominal tenderness.   Musculoskeletal:         General: No deformity. Normal range of motion.      Cervical back: Normal range of motion and neck supple.   Skin:     General: Skin is warm and dry.      Findings: No erythema or rash.   Neurological:      Mental Status: He is alert and oriented to person, place, and time.      Cranial Nerves: No cranial nerve deficit.   Psychiatric:         Behavior: Behavior normal.         Thought Content: Thought content normal.         Judgment: Judgment normal.             PAST MEDICAL HISTORY     Past Medical History:   Diagnosis Date   • Hyperlipidemia    • Seasonal allergies       PAST SURGICAL HISTORY     Past Surgical History:   Procedure Laterality Date   • COLONOSCOPY N/A 1/17/2023    Procedure: COLONOSCOPY;  Surgeon: Carlton Olson MD;  Location: [facility] ENDOSCOPY;  Service: Gastroenterology;  Laterality: N/A;   • ENDOSCOPY N/A 1/17/2023    Procedure: ESOPHAGOGASTRODUODENOSCOPY;  Surgeon: Carlton Olson MD;  Location: [facility] ENDOSCOPY;  Service: Gastroenterology;  Laterality: N/A;   • GASTRIC BYPASS        SOCIAL HISTORY     Social History     Socioeconomic History   • Marital status:    Tobacco Use   • Smoking status: Never      Passive exposure: Never   • Smokeless tobacco: Never   Vaping Use   • Vaping Use: Never used   Substance and Sexual Activity   • Alcohol use: Yes     Alcohol/week: 2.0 standard drinks     Types: 1 Cans of beer, 1 Shots of liquor per week   • Drug use: Never   • Sexual activity: Defer      ALLERGIES   Patient has no known allergies.   MEDICATIONS     Current Outpatient Medications   Medication Sig Dispense Refill   • Calcium Citrate (CITRACAL PO) Take 1,200 mg by mouth.     • folic acid (FOLVITE) 1 MG tablet Take 1 tablet by mouth Daily. 90 tablet 1   • LORATADINE ALLERGY RELIEF PO Take  by mouth.     • multivitamin with minerals tablet tablet Take 1 tablet by mouth Daily.     • vitamin B-12 (CYANOCOBALAMIN) 1000 MCG tablet Take 1 tablet by mouth Daily. 90 tablet 1   • Vitamin D, Cholecalciferol, (CHOLECALCIFEROL) 10 MCG (400 UNIT) tablet Take 1 tablet by mouth Daily.     • nebivolol (Bystolic) 5 MG tablet Take 1 tablet by mouth Daily. REPLACES METOPROLOL 30 tablet 0   • olmesartan (Benicar) 40 MG tablet Take 1 tablet by mouth Daily. REPLACES LISINOPRIL 30 tablet 0     No current facility-administered medications for this visit.        The following portions of the patient's history were reviewed and updated as appropriate: allergies, current medications, past family history, past medical history, past social history, past surgical history and problem list.        Assessment & Plan   Diagnoses and all orders for this visit:    1. Abnormal x-ray (Primary)  -     Ambulatory Referral to Vascular Surgery    2. Hyperparathyroidism  -     PTH, Intact; Future    3. Other fatigue    4. Hypertension, unspecified type    5. Bradycardia    Other orders  -     nebivolol (Bystolic) 5 MG tablet; Take 1 tablet by mouth Daily. REPLACES METOPROLOL  Dispense: 30 tablet; Refill: 0  -     olmesartan (Benicar) 40 MG tablet; Take 1 tablet by mouth Daily. REPLACES LISINOPRIL  Dispense: 30 tablet; Refill: 0    I changed his bp medicine,  recheck 2 weeks.     Bradycardia may be making him have fatigue. Will change his metoprolol 50mg bid to bystolic 5mg qd.     benicar is more powerful for bp control    Referral to vascular    pth in a few weeks. Vit d already ordered.                  No follow-ups on file.                  This document has been electronically signed by Trell Brooks MD on April 3, 2023 17:24 CDT     Answers for HPI/ROS submitted by the patient on 4/2/2023  What is the primary reason for your visit?: High Blood Pressure

## 2023-04-04 ENCOUNTER — INFUSION (OUTPATIENT)
Dept: ONCOLOGY | Facility: HOSPITAL | Age: 63
End: 2023-04-04
Payer: COMMERCIAL

## 2023-04-04 VITALS
HEART RATE: 51 BPM | TEMPERATURE: 98 F | SYSTOLIC BLOOD PRESSURE: 200 MMHG | DIASTOLIC BLOOD PRESSURE: 82 MMHG | RESPIRATION RATE: 18 BRPM | OXYGEN SATURATION: 98 %

## 2023-04-13 ENCOUNTER — TELEPHONE (OUTPATIENT)
Dept: ONCOLOGY | Facility: CLINIC | Age: 63
End: 2023-04-13
Payer: COMMERCIAL

## 2023-04-13 DIAGNOSIS — I72.8 SPLENIC ARTERY ANEURYSM: Primary | ICD-10-CM

## 2023-04-15 NOTE — PROGRESS NOTES
Chief Complaint   Patient presents with   • Anemia     2 month f/u        Subjective    Blas James is a 62 y.o. male.    History of Present Illness  Patient presented to GI clinic for follow-up visit today.  Feels better currently.  Has continued iron deficiency anemia.  Iron infusions are not helping.  Most recent iron saturation to 6%.  Denied abdominal pain, rectal bleeding, melena or weight loss.  Bowel movements regular.       The following portions of the patient's history were reviewed and updated as appropriate:   Past Medical History:   Diagnosis Date   • Hyperlipidemia    • Seasonal allergies      Past Surgical History:   Procedure Laterality Date   • COLONOSCOPY N/A 1/17/2023    Procedure: COLONOSCOPY;  Surgeon: Carlton Olson MD;  Location: [facility] ENDOSCOPY;  Service: Gastroenterology;  Laterality: N/A;   • ENDOSCOPY N/A 1/17/2023    Procedure: ESOPHAGOGASTRODUODENOSCOPY;  Surgeon: Carlton Olson MD;  Location: [facility] ENDOSCOPY;  Service: Gastroenterology;  Laterality: N/A;   • GASTRIC BYPASS       History reviewed. No pertinent family history.    Prior to Admission medications    Medication Sig Start Date End Date Taking? Authorizing Provider   folic acid (FOLVITE) 1 MG tablet Take 1 tablet by mouth Daily. 1/11/23  Yes Awais Malave MD   multivitamin with minerals tablet tablet Take 1 tablet by mouth Daily.   Yes Sanford Bedoya MD   vitamin B-12 (CYANOCOBALAMIN) 1000 MCG tablet Take 1 tablet by mouth Daily. 1/11/23  Yes Awais Malave MD   Vitamin D, Cholecalciferol, (CHOLECALCIFEROL) 10 MCG (400 UNIT) tablet Take 1 tablet by mouth Daily.   Yes Sanford Bedoya MD   Calcium Citrate (CITRACAL PO) Take 1,200 mg by mouth.    ProviderSanford MD   LORATADINE ALLERGY RELIEF PO Take  by mouth.    ProviderSanford MD   nebivolol (Bystolic) 5 MG tablet Take 1 tablet by mouth Daily. REPLACES METOPROLOL 4/3/23   Trell Brooks MD   olmesartan (Benicar) 40 MG tablet Take  "1 tablet by mouth Daily. REPLACES LISINOPRIL 4/3/23   Trell Brooks MD     No Known Allergies  Social History     Socioeconomic History   • Marital status:    Tobacco Use   • Smoking status: Never     Passive exposure: Never   • Smokeless tobacco: Never   Vaping Use   • Vaping Use: Never used   Substance and Sexual Activity   • Alcohol use: Yes     Alcohol/week: 2.0 standard drinks     Types: 1 Cans of beer, 1 Shots of liquor per week   • Drug use: Never   • Sexual activity: Defer       Review of Systems  Review of Systems   Constitutional: Negative for chills, fatigue, fever and unexpected weight change.   HENT: Negative for congestion, ear discharge, hearing loss, nosebleeds and sore throat.    Eyes: Negative for pain, discharge and redness.   Respiratory: Negative for cough, chest tightness, shortness of breath and wheezing.    Cardiovascular: Negative for chest pain and palpitations.   Gastrointestinal: Negative for abdominal distention, abdominal pain, blood in stool, constipation, diarrhea, nausea and vomiting.   Endocrine: Negative for cold intolerance, polydipsia, polyphagia and polyuria.   Genitourinary: Negative for dysuria, flank pain, frequency, hematuria and urgency.   Musculoskeletal: Negative for arthralgias, back pain, joint swelling and myalgias.   Skin: Negative for color change, pallor and rash.   Neurological: Negative for tremors, seizures, syncope, weakness and headaches.   Hematological: Negative for adenopathy. Does not bruise/bleed easily.   Psychiatric/Behavioral: Negative for behavioral problems, confusion, dysphoric mood, hallucinations and suicidal ideas. The patient is not nervous/anxious.         /81 (BP Location: Right arm)   Pulse (!) 46   Ht 177.8 cm (70\")   Wt 109 kg (240 lb)   BMI 34.44 kg/m²     Objective    Physical Exam  Constitutional:       Appearance: He is well-developed.   HENT:      Head: Normocephalic and atraumatic.   Eyes:      Conjunctiva/sclera: " Conjunctivae normal.      Pupils: Pupils are equal, round, and reactive to light.   Neck:      Thyroid: No thyromegaly.   Cardiovascular:      Rate and Rhythm: Normal rate and regular rhythm.      Heart sounds: Normal heart sounds. No murmur heard.  Pulmonary:      Effort: Pulmonary effort is normal.      Breath sounds: Normal breath sounds. No wheezing.   Abdominal:      General: Bowel sounds are normal. There is no distension.      Palpations: Abdomen is soft. There is no mass.      Tenderness: There is no abdominal tenderness.      Hernia: No hernia is present.   Genitourinary:     Comments: No lesions noted  Musculoskeletal:         General: No tenderness. Normal range of motion.      Cervical back: Normal range of motion and neck supple.   Lymphadenopathy:      Cervical: No cervical adenopathy.   Skin:     General: Skin is warm and dry.      Findings: No rash.   Neurological:      Mental Status: He is alert and oriented to person, place, and time.      Cranial Nerves: No cranial nerve deficit.   Psychiatric:         Thought Content: Thought content normal.       Lab on 03/15/2023   Component Date Value Ref Range Status   • Iron 03/15/2023 31 (L)  59 - 158 mcg/dL Final   • Iron Saturation 03/15/2023 6 (L)  20 - 50 % Final   • Transferrin 03/15/2023 323  200 - 360 mg/dL Final   • TIBC 03/15/2023 481  298 - 536 mcg/dL Final   • Ferritin 03/15/2023 10.84 (L)  30.00 - 400.00 ng/mL Final   • Vitamin B-12 03/15/2023 795  211 - 946 pg/mL Final   • Folate 03/15/2023 13.10  4.78 - 24.20 ng/mL Final   • WBC 03/15/2023 7.82  3.40 - 10.80 10*3/mm3 Final   • RBC 03/15/2023 4.76  4.14 - 5.80 10*6/mm3 Final   • Hemoglobin 03/15/2023 11.6 (L)  13.0 - 17.7 g/dL Final   • Hematocrit 03/15/2023 38.5  37.5 - 51.0 % Final   • MCV 03/15/2023 80.9  79.0 - 97.0 fL Final   • MCH 03/15/2023 24.4 (L)  26.6 - 33.0 pg Final   • MCHC 03/15/2023 30.1 (L)  31.5 - 35.7 g/dL Final   • RDW 03/15/2023 22.9 (H)  12.3 - 15.4 % Final   • RDW-SD  03/15/2023 67.0 (H)  37.0 - 54.0 fl Final   • MPV 03/15/2023 9.9  6.0 - 12.0 fL Final   • Platelets 03/15/2023 172  140 - 450 10*3/mm3 Final   • Neutrophil % 03/15/2023 51.0  42.7 - 76.0 % Final   • Lymphocyte % 03/15/2023 37.0  19.6 - 45.3 % Final   • Monocyte % 03/15/2023 7.4  5.0 - 12.0 % Final   • Eosinophil % 03/15/2023 3.1  0.3 - 6.2 % Final   • Basophil % 03/15/2023 1.2  0.0 - 1.5 % Final   • Immature Grans % 03/15/2023 0.3  0.0 - 0.5 % Final   • Neutrophils, Absolute 03/15/2023 4.00  1.70 - 7.00 10*3/mm3 Final   • Lymphocytes, Absolute 03/15/2023 2.89  0.70 - 3.10 10*3/mm3 Final   • Monocytes, Absolute 03/15/2023 0.58  0.10 - 0.90 10*3/mm3 Final   • Eosinophils, Absolute 03/15/2023 0.24  0.00 - 0.40 10*3/mm3 Final   • Basophils, Absolute 03/15/2023 0.09  0.00 - 0.20 10*3/mm3 Final   • Immature Grans, Absolute 03/15/2023 0.02  0.00 - 0.05 10*3/mm3 Final   • nRBC 03/15/2023 0.0  0.0 - 0.2 /100 WBC Final   • PTH, Intact 03/15/2023 108.6 (H)  15.0 - 65.0 pg/mL Final   • 25 Hydroxy, Vitamin D 03/15/2023 27.9 (L)  30.0 - 100.0 ng/ml Final     Assessment & Plan      1. Other iron deficiency anemia    1.  Iron deficiency anemia, likely due to gastric bypass status.  Need to rule out small bowel pathology.  Obtain capsule endoscopy.  Continue iron supplements.  3.  Colon polyp, repeat in 3 years  3.  Obesity s/p gastric bypass, recommend exercise and diet control.  4.  Elevated serum alkaline phosphatase level likely due to fatty liver disease.  Recommend strict alcohol cessation.  Obtain LFTs in next visit.  Recommend exercise and diet control.       Orders placed during this encounter include:  Orders Placed This Encounter   Procedures   • Endoscopy, GI With Capsule     Standing Status:   Future     Standing Expiration Date:   3/24/2024     Order Specific Question:   Release to patient     Answer:   Routine Release       * Surgery not found *    Review and/or summary of lab tests, radiology, procedures,  medications. Review and summary of old records and obtaining of history. The risks and benefits of my recommendations, as well as other treatment options were discussed with the patient today. Questions were answered.    No orders of the defined types were placed in this encounter.      Follow-up: Return in about 1 month (around 4/24/2023).               Results for orders placed or performed in visit on 03/15/23   CBC Auto Differential    Specimen: Arm, Left; Blood   Result Value Ref Range    WBC 7.82 3.40 - 10.80 10*3/mm3    RBC 4.76 4.14 - 5.80 10*6/mm3    Hemoglobin 11.6 (L) 13.0 - 17.7 g/dL    Hematocrit 38.5 37.5 - 51.0 %    MCV 80.9 79.0 - 97.0 fL    MCH 24.4 (L) 26.6 - 33.0 pg    MCHC 30.1 (L) 31.5 - 35.7 g/dL    RDW 22.9 (H) 12.3 - 15.4 %    RDW-SD 67.0 (H) 37.0 - 54.0 fl    MPV 9.9 6.0 - 12.0 fL    Platelets 172 140 - 450 10*3/mm3    Neutrophil % 51.0 42.7 - 76.0 %    Lymphocyte % 37.0 19.6 - 45.3 %    Monocyte % 7.4 5.0 - 12.0 %    Eosinophil % 3.1 0.3 - 6.2 %    Basophil % 1.2 0.0 - 1.5 %    Immature Grans % 0.3 0.0 - 0.5 %    Neutrophils, Absolute 4.00 1.70 - 7.00 10*3/mm3    Lymphocytes, Absolute 2.89 0.70 - 3.10 10*3/mm3    Monocytes, Absolute 0.58 0.10 - 0.90 10*3/mm3    Eosinophils, Absolute 0.24 0.00 - 0.40 10*3/mm3    Basophils, Absolute 0.09 0.00 - 0.20 10*3/mm3    Immature Grans, Absolute 0.02 0.00 - 0.05 10*3/mm3    nRBC 0.0 0.0 - 0.2 /100 WBC   Iron Profile    Specimen: Blood   Result Value Ref Range    Iron 31 (L) 59 - 158 mcg/dL    Iron Saturation 6 (L) 20 - 50 %    Transferrin 323 200 - 360 mg/dL    TIBC 481 298 - 536 mcg/dL   Vitamin D,25-Hydroxy    Specimen: Arm, Left; Blood   Result Value Ref Range    25 Hydroxy, Vitamin D 27.9 (L) 30.0 - 100.0 ng/ml   PTH, Intact    Specimen: Blood   Result Value Ref Range    PTH, Intact 108.6 (H) 15.0 - 65.0 pg/mL   Folate    Specimen: Blood   Result Value Ref Range    Folate 13.10 4.78 - 24.20 ng/mL   Ferritin    Specimen: Blood   Result Value Ref  Range    Ferritin 10.84 (L) 30.00 - 400.00 ng/mL   Vitamin B12    Specimen: Blood   Result Value Ref Range    Vitamin B-12 795 211 - 946 pg/mL   Results for orders placed or performed during the hospital encounter of 23   TISSUE EXAM, [facility] ([facility])    Specimen: A: Small Intestine, Duodenum; Tissue    B: Gastric, Body; Tissue    C: Esophagus; Tissue    D: Large Intestine, Cecum; Polyp   Result Value Ref Range    Reference Lab Report       [facility]  [address] [place] [address]  Phone: [phone] or [phone]  Fax: [phone]  Jamie Mcdonald M.D., Medical Director    PATIENT NAME                           LABORATORY NO.  [id number]  [address].                [id number]  [id number]                         AGE              SEX  SSN           CLIENT REF #  [facility]           62      1960      [id number]   [id number]    [place]                       REQUESTING M.D.     ATTENDING M.D.     COPY TO[address]                 SYDNEE KEYS MICHAEL  [address]  DATE COLLECTED      DATE RECEIVED      DATE REPORTED  2023    DIAGNOSIS:  A.   SMALL BOWEL, BIOPSY:  Small intestinal mucosa with no significant histopathologic abnormalities  B.   GASTRIC BIOPSY:  Gastric antral type mucosa with reactive (chemical) gastropathy  Negative for intestinal  metaplasia or dysplasia  No Helicobacter pylori-like organisms seen  C.   GE JUNCTION:  Squamous mucosa with features suggestive of reflux esophagitis (no  eosinophils seen)  Glandular mucosa with mild chronic inflammation  Negative for intestinal metaplasia or dysplasia  Negative for specific microorganisms  D.   CECUM POLYP:  Adenomatous polyp (tubular adenoma)  Negative for high-grade dysplasia    SUSIE    CLINICAL HISTORY:  History of gastric bypass, other iron deficiency  "anemia    SPECIMENS RECEIVED:  A.  SMALL BOWEL, BIOPSY  B.  GASTRIC BIOPSY  C.  GE JUNCTION  D.  CECUM POLYP    MICROSCOPIC DESCRIPTION:  Tissue blocks are prepared and slides are examined microscopically on all  specimens. See diagnosis for details.    Professional interpretation rendered by Silvano Jay M.D., MARY LOU at [facility], [address] [place] [address].    GROSS DESCRIPTION:  A.  The specimen is received in 1 formalin filled container labeled \"small  bowel biopsy\" and consists of multiple  pieces of tan soft tissue measuring  0.5 x 0.3 x 0.2 cm in aggregate.  The specimen is submitted entirely in 1  cassette.  KAMI  B.  Specimen is received in 1 formalin filled container labeled \"gastric biopsy\"  and consists of 3 pieces of tan soft tissue measuring 0.4 x 0.3 x 0.2 cm in  aggregate.  The specimen is submitted entirely in 1 cassette.  C.  Specimen is received in 1 formalin filled container labeled \"EG junction  biopsy\" and consists of multiple pieces of tan soft tissue measuring 0.3 x  0.3 x 0.2 cm in aggregate.  The specimen is submitted entirely in 1  cassette.  D.  Specimen is received in 1 formalin filled container labeled \"cecum polyp\"  and consists of 1 piece of tan soft tissue measuring 0.3 x 0.2 x 0.2 cm.  Specimen is submitted entirely in 1 cassette.    REVIEWED, DIAGNOSED AND ELECTRONICALLY  SIGNED BY:    Silvano Jay M.D., [employer].  CPT CODES:  88305x4     Results for orders placed or performed in visit on 01/11/23   CBC Auto Differential    Specimen: Blood   Result Value Ref Range    WBC 8.87 3.40 - 10.80 10*3/mm3    RBC 4.11 (L) 4.14 - 5.80 10*6/mm3    Hemoglobin 7.5 (L) 13.0 - 17.7 g/dL    Hematocrit 28.5 (L) 37.5 - 51.0 %    MCV 69.3 (L) 79.0 - 97.0 fL    MCH 18.2 (L) 26.6 - 33.0 pg    MCHC 26.3 (L) 31.5 - 35.7 g/dL    RDW 22.9 (H) 12.3 - 15.4 %    RDW-SD 55.8 (H) 37.0 - 54.0 fl    MPV 9.8 6.0 - 12.0 fL    Platelets 279 140 - 450 10*3/mm3    Neutrophil % 58.0 42.7 - 76.0 % "    Lymphocyte % 31.8 19.6 - 45.3 %    Monocyte % 6.4 5.0 - 12.0 %    Eosinophil % 1.8 0.3 - 6.2 %    Basophil % 1.5 0.0 - 1.5 %    Immature Grans % 0.5 0.0 - 0.5 %    Neutrophils, Absolute 5.15 1.70 - 7.00 10*3/mm3    Lymphocytes, Absolute 2.82 0.70 - 3.10 10*3/mm3    Monocytes, Absolute 0.57 0.10 - 0.90 10*3/mm3    Eosinophils, Absolute 0.16 0.00 - 0.40 10*3/mm3    Basophils, Absolute 0.13 0.00 - 0.20 10*3/mm3    Immature Grans, Absolute 0.04 0.00 - 0.05 10*3/mm3    nRBC 0.0 0.0 - 0.2 /100 WBC   Iron and TIBC    Specimen: Blood   Result Value Ref Range    Iron 13 (L) 59 - 158 mcg/dL    Iron Saturation 2 (L) 20 - 50 %    Transferrin 361 (H) 200 - 360 mg/dL    TIBC 538 (H) 298 - 536 mcg/dL   Ferritin    Specimen: Blood   Result Value Ref Range    Ferritin 5.56 (L) 30.00 - 400.00 ng/mL   Results for orders placed or performed in visit on 01/10/23   POCT FECAL OCCULT BLOOD BY IMMUNOASSAY    Specimen: Stool   Result Value Ref Range    POC OCCULT BLOOD BY IMMUNOASSAY Negative Negative   Results for orders placed or performed in visit on 01/04/23   Iron and TIBC    Specimen: Blood   Result Value Ref Range    Iron 14 (L) 59 - 158 mcg/dL    Iron Saturation 3 (L) 20 - 50 %    Transferrin 335 200 - 360 mg/dL    TIBC 499 298 - 536 mcg/dL   Vitamin D,25-Hydroxy    Specimen: Blood   Result Value Ref Range    25 Hydroxy, Vitamin D 18.4 (L) 30.0 - 100.0 ng/ml   Reticulocytes    Specimen: Blood   Result Value Ref Range    Reticulocyte % 2.07 (H) 0.70 - 1.90 %    Reticulocyte Absolute 0.0799 0.0200 - 0.1300 10*6/mm3   CBC (No Diff)    Specimen: Blood   Result Value Ref Range    WBC 7.64 3.40 - 10.80 10*3/mm3    RBC 3.84 (L) 4.14 - 5.80 10*6/mm3    Hemoglobin 6.9 (C) 13.0 - 17.7 g/dL    Hematocrit 25.6 (L) 37.5 - 51.0 %    MCV 66.7 (L) 79.0 - 97.0 fL    MCH 18.0 (L) 26.6 - 33.0 pg    MCHC 27.0 (L) 31.5 - 35.7 g/dL    RDW 19.8 (H) 12.3 - 15.4 %    RDW-SD 46.5 37.0 - 54.0 fl    Platelets 244 140 - 450 10*3/mm3   TSH    Specimen: Blood    Result Value Ref Range    TSH 2.850 0.270 - 4.200 uIU/mL   Vitamin B6    Specimen: Blood   Result Value Ref Range    Vitamin B6 8.0 3.4 - 65.2 ug/L   PTH, Intact    Specimen: Blood   Result Value Ref Range    PTH, Intact 106.0 (H) 15.0 - 65.0 pg/mL   Folate    Specimen: Blood   Result Value Ref Range    Folate 6.18 4.78 - 24.20 ng/mL   Ferritin    Specimen: Blood   Result Value Ref Range    Ferritin 4.79 (L) 30.00 - 400.00 ng/mL   Vitamin B12    Specimen: Blood   Result Value Ref Range    Vitamin B-12 497 211 - 946 pg/mL         This document has been electronically signed by Carlton Olson MD on April 15, 2023 18:07 CDT

## 2023-04-20 RX ORDER — METHYLPREDNISOLONE SODIUM SUCCINATE 125 MG/2ML
125 INJECTION, POWDER, LYOPHILIZED, FOR SOLUTION INTRAMUSCULAR; INTRAVENOUS AS NEEDED
Status: CANCELLED
Start: 2023-04-20

## 2023-04-21 ENCOUNTER — INFUSION (OUTPATIENT)
Dept: ONCOLOGY | Facility: HOSPITAL | Age: 63
End: 2023-04-21
Payer: COMMERCIAL

## 2023-04-21 VITALS — HEART RATE: 66 BPM | TEMPERATURE: 97.9 F | DIASTOLIC BLOOD PRESSURE: 81 MMHG | SYSTOLIC BLOOD PRESSURE: 159 MMHG

## 2023-04-21 DIAGNOSIS — Z98.84 HISTORY OF GASTRIC BYPASS: ICD-10-CM

## 2023-04-21 DIAGNOSIS — D50.0 IRON DEFICIENCY ANEMIA DUE TO CHRONIC BLOOD LOSS: ICD-10-CM

## 2023-04-21 DIAGNOSIS — K90.9 IRON MALABSORPTION: Primary | ICD-10-CM

## 2023-04-21 PROCEDURE — 25010000002 IRON SUCROSE PER 1 MG: Performed by: INTERNAL MEDICINE

## 2023-04-21 PROCEDURE — 63710000001 DIPHENHYDRAMINE PER 50 MG: Performed by: INTERNAL MEDICINE

## 2023-04-21 RX ORDER — DIPHENHYDRAMINE HCL 25 MG
25 CAPSULE ORAL ONCE
Status: COMPLETED | OUTPATIENT
Start: 2023-04-21 | End: 2023-04-21

## 2023-04-21 RX ORDER — FAMOTIDINE 10 MG/ML
20 INJECTION, SOLUTION INTRAVENOUS AS NEEDED
Status: DISCONTINUED | OUTPATIENT
Start: 2023-04-21 | End: 2023-04-21 | Stop reason: HOSPADM

## 2023-04-21 RX ORDER — FAMOTIDINE 10 MG/ML
20 INJECTION, SOLUTION INTRAVENOUS AS NEEDED
OUTPATIENT
Start: 2023-04-23

## 2023-04-21 RX ORDER — DIPHENHYDRAMINE HCL 25 MG
25 CAPSULE ORAL ONCE
OUTPATIENT
Start: 2023-04-23

## 2023-04-21 RX ORDER — FAMOTIDINE 20 MG/1
20 TABLET, FILM COATED ORAL ONCE
OUTPATIENT
Start: 2023-04-23

## 2023-04-21 RX ORDER — ACETAMINOPHEN 325 MG/1
650 TABLET ORAL ONCE
OUTPATIENT
Start: 2023-04-23

## 2023-04-21 RX ORDER — METHYLPREDNISOLONE SODIUM SUCCINATE 125 MG/2ML
125 INJECTION, POWDER, LYOPHILIZED, FOR SOLUTION INTRAMUSCULAR; INTRAVENOUS AS NEEDED
Start: 2023-04-23

## 2023-04-21 RX ORDER — DIPHENHYDRAMINE HYDROCHLORIDE 50 MG/ML
50 INJECTION INTRAMUSCULAR; INTRAVENOUS AS NEEDED
OUTPATIENT
Start: 2023-04-23

## 2023-04-21 RX ORDER — ACETAMINOPHEN 325 MG/1
650 TABLET ORAL ONCE
Status: COMPLETED | OUTPATIENT
Start: 2023-04-21 | End: 2023-04-21

## 2023-04-21 RX ORDER — FAMOTIDINE 20 MG/1
20 TABLET, FILM COATED ORAL ONCE
Status: COMPLETED | OUTPATIENT
Start: 2023-04-21 | End: 2023-04-21

## 2023-04-21 RX ORDER — SODIUM CHLORIDE 9 MG/ML
250 INJECTION, SOLUTION INTRAVENOUS ONCE
OUTPATIENT
Start: 2023-04-23

## 2023-04-21 RX ORDER — SODIUM CHLORIDE 9 MG/ML
250 INJECTION, SOLUTION INTRAVENOUS ONCE
Status: COMPLETED | OUTPATIENT
Start: 2023-04-21 | End: 2023-04-21

## 2023-04-21 RX ORDER — DIPHENHYDRAMINE HYDROCHLORIDE 50 MG/ML
50 INJECTION INTRAMUSCULAR; INTRAVENOUS AS NEEDED
Status: DISCONTINUED | OUTPATIENT
Start: 2023-04-21 | End: 2023-04-21 | Stop reason: HOSPADM

## 2023-04-21 RX ADMIN — FAMOTIDINE 20 MG: 20 TABLET, FILM COATED ORAL at 14:13

## 2023-04-21 RX ADMIN — IRON SUCROSE 200 MG: 20 INJECTION, SOLUTION INTRAVENOUS at 14:45

## 2023-04-21 RX ADMIN — DIPHENHYDRAMINE HYDROCHLORIDE 25 MG: 25 CAPSULE ORAL at 14:14

## 2023-04-21 RX ADMIN — SODIUM CHLORIDE 250 ML: 9 INJECTION, SOLUTION INTRAVENOUS at 14:15

## 2023-04-21 RX ADMIN — ACETAMINOPHEN 650 MG: 325 TABLET ORAL at 14:13

## 2023-04-22 ENCOUNTER — TRANSCRIBE ORDERS (OUTPATIENT)
Dept: CT IMAGING | Facility: HOSPITAL | Age: 63
End: 2023-04-22
Payer: COMMERCIAL

## 2023-04-22 DIAGNOSIS — I72.8 SPLENIC ARTERY ANEURYSM: Primary | ICD-10-CM

## 2023-04-25 ENCOUNTER — INFUSION (OUTPATIENT)
Dept: ONCOLOGY | Facility: HOSPITAL | Age: 63
End: 2023-04-25
Payer: COMMERCIAL

## 2023-04-25 VITALS
SYSTOLIC BLOOD PRESSURE: 164 MMHG | RESPIRATION RATE: 18 BRPM | DIASTOLIC BLOOD PRESSURE: 77 MMHG | HEART RATE: 62 BPM | TEMPERATURE: 98 F

## 2023-04-25 DIAGNOSIS — Z98.84 HISTORY OF GASTRIC BYPASS: ICD-10-CM

## 2023-04-25 DIAGNOSIS — K90.9 IRON MALABSORPTION: Primary | ICD-10-CM

## 2023-04-25 DIAGNOSIS — D50.0 IRON DEFICIENCY ANEMIA DUE TO CHRONIC BLOOD LOSS: ICD-10-CM

## 2023-04-25 PROCEDURE — 96374 THER/PROPH/DIAG INJ IV PUSH: CPT | Performed by: INTERNAL MEDICINE

## 2023-04-25 PROCEDURE — 63710000001 DIPHENHYDRAMINE PER 50 MG: Performed by: INTERNAL MEDICINE

## 2023-04-25 PROCEDURE — 25010000002 IRON SUCROSE PER 1 MG: Performed by: INTERNAL MEDICINE

## 2023-04-25 RX ORDER — FAMOTIDINE 20 MG/1
20 TABLET, FILM COATED ORAL ONCE
Status: COMPLETED | OUTPATIENT
Start: 2023-04-25 | End: 2023-04-25

## 2023-04-25 RX ORDER — DIPHENHYDRAMINE HCL 25 MG
25 CAPSULE ORAL ONCE
Status: COMPLETED | OUTPATIENT
Start: 2023-04-25 | End: 2023-04-25

## 2023-04-25 RX ORDER — FAMOTIDINE 20 MG/1
20 TABLET, FILM COATED ORAL ONCE
Status: CANCELLED | OUTPATIENT
Start: 2023-04-27

## 2023-04-25 RX ORDER — DIPHENHYDRAMINE HYDROCHLORIDE 50 MG/ML
50 INJECTION INTRAMUSCULAR; INTRAVENOUS AS NEEDED
Status: CANCELLED | OUTPATIENT
Start: 2023-04-27

## 2023-04-25 RX ORDER — FAMOTIDINE 10 MG/ML
20 INJECTION, SOLUTION INTRAVENOUS AS NEEDED
Status: CANCELLED | OUTPATIENT
Start: 2023-04-27

## 2023-04-25 RX ORDER — SODIUM CHLORIDE 9 MG/ML
250 INJECTION, SOLUTION INTRAVENOUS ONCE
Status: CANCELLED | OUTPATIENT
Start: 2023-04-27

## 2023-04-25 RX ORDER — ACETAMINOPHEN 325 MG/1
650 TABLET ORAL ONCE
Status: CANCELLED | OUTPATIENT
Start: 2023-04-27

## 2023-04-25 RX ORDER — METHYLPREDNISOLONE SODIUM SUCCINATE 125 MG/2ML
125 INJECTION, POWDER, LYOPHILIZED, FOR SOLUTION INTRAMUSCULAR; INTRAVENOUS AS NEEDED
Status: CANCELLED
Start: 2023-04-27

## 2023-04-25 RX ORDER — FAMOTIDINE 10 MG/ML
20 INJECTION, SOLUTION INTRAVENOUS AS NEEDED
Status: DISCONTINUED | OUTPATIENT
Start: 2023-04-25 | End: 2023-04-25 | Stop reason: HOSPADM

## 2023-04-25 RX ORDER — DIPHENHYDRAMINE HCL 25 MG
25 CAPSULE ORAL ONCE
Status: CANCELLED | OUTPATIENT
Start: 2023-04-27

## 2023-04-25 RX ORDER — DIPHENHYDRAMINE HYDROCHLORIDE 50 MG/ML
50 INJECTION INTRAMUSCULAR; INTRAVENOUS AS NEEDED
Status: DISCONTINUED | OUTPATIENT
Start: 2023-04-25 | End: 2023-04-25 | Stop reason: HOSPADM

## 2023-04-25 RX ORDER — SODIUM CHLORIDE 9 MG/ML
250 INJECTION, SOLUTION INTRAVENOUS ONCE
Status: COMPLETED | OUTPATIENT
Start: 2023-04-25 | End: 2023-04-25

## 2023-04-25 RX ORDER — ACETAMINOPHEN 325 MG/1
650 TABLET ORAL ONCE
Status: COMPLETED | OUTPATIENT
Start: 2023-04-25 | End: 2023-04-25

## 2023-04-25 RX ADMIN — SODIUM CHLORIDE 250 ML: 9 INJECTION, SOLUTION INTRAVENOUS at 14:32

## 2023-04-25 RX ADMIN — IRON SUCROSE 200 MG: 20 INJECTION, SOLUTION INTRAVENOUS at 14:59

## 2023-04-25 RX ADMIN — ACETAMINOPHEN 650 MG: 325 TABLET ORAL at 14:28

## 2023-04-25 RX ADMIN — DIPHENHYDRAMINE HYDROCHLORIDE 25 MG: 25 CAPSULE ORAL at 14:28

## 2023-04-25 RX ADMIN — FAMOTIDINE 20 MG: 20 TABLET, FILM COATED ORAL at 14:28

## 2023-04-26 ENCOUNTER — HOSPITAL ENCOUNTER (OUTPATIENT)
Dept: CT IMAGING | Facility: HOSPITAL | Age: 63
Discharge: HOME | End: 2023-04-26
Payer: COMMERCIAL

## 2023-04-26 ENCOUNTER — LAB (OUTPATIENT)
Dept: LAB | Facility: HOSPITAL | Age: 63
End: 2023-04-26
Payer: COMMERCIAL

## 2023-04-26 DIAGNOSIS — I72.8 SPLENIC ARTERY ANEURYSM: ICD-10-CM

## 2023-04-26 LAB
BUN SERPL-MCNC: 18 MG/DL (ref 8–23)
CREAT SERPL-MCNC: 1.03 MG/DL (ref 0.76–1.27)
EGFRCR SERPLBLD CKD-EPI 2021: 82.6 ML/MIN/1.73

## 2023-04-26 PROCEDURE — 36415 COLL VENOUS BLD VENIPUNCTURE: CPT

## 2023-04-26 PROCEDURE — 74174 CTA ABD&PLVS W/CONTRAST: CPT

## 2023-04-26 PROCEDURE — 25510000001 IOPAMIDOL PER 1 ML: Performed by: THORACIC SURGERY (CARDIOTHORACIC VASCULAR SURGERY)

## 2023-04-26 PROCEDURE — 82565 ASSAY OF CREATININE: CPT

## 2023-04-26 PROCEDURE — 84520 ASSAY OF UREA NITROGEN: CPT

## 2023-04-26 RX ADMIN — IOPAMIDOL 90 ML: 755 INJECTION, SOLUTION INTRAVENOUS at 10:24

## 2023-04-27 ENCOUNTER — OFFICE VISIT (OUTPATIENT)
Dept: CARDIAC SURGERY | Facility: CLINIC | Age: 63
End: 2023-04-27
Payer: COMMERCIAL

## 2023-04-27 VITALS
TEMPERATURE: 97.8 F | HEIGHT: 70 IN | HEART RATE: 61 BPM | DIASTOLIC BLOOD PRESSURE: 94 MMHG | SYSTOLIC BLOOD PRESSURE: 160 MMHG | BODY MASS INDEX: 33.5 KG/M2 | OXYGEN SATURATION: 98 % | WEIGHT: 234 LBS

## 2023-04-27 DIAGNOSIS — N28.89 RENAL MASS: ICD-10-CM

## 2023-04-27 DIAGNOSIS — Z98.84 HISTORY OF GASTRIC BYPASS: Chronic | ICD-10-CM

## 2023-04-27 DIAGNOSIS — I72.8 SPLENIC ARTERY ANEURYSM: Primary | ICD-10-CM

## 2023-04-27 DIAGNOSIS — I10 BENIGN ESSENTIAL HTN: ICD-10-CM

## 2023-04-27 DIAGNOSIS — E66.09 CLASS 1 OBESITY DUE TO EXCESS CALORIES WITH SERIOUS COMORBIDITY AND BODY MASS INDEX (BMI) OF 34.0 TO 34.9 IN ADULT: ICD-10-CM

## 2023-04-27 PROCEDURE — 99204 OFFICE O/P NEW MOD 45 MIN: CPT | Performed by: THORACIC SURGERY (CARDIOTHORACIC VASCULAR SURGERY)

## 2023-04-27 RX ORDER — FERROUS SULFATE 325(65) MG
325 TABLET ORAL
COMMUNITY

## 2023-04-27 NOTE — LETTER
August 21, 2023     Trell Brooks MD  [address] [facility] [place] [address]    Patient: Blas James   YOB: 1960   Date of Visit: 4/27/2023     Dear Trell Brooks MD:       Thank you for referring Blas James to me for evaluation. Below are the relevant portions of my assessment and plan of care.    If you have questions, please do not hesitate to call me. I look forward to following Blas along with you.         Sincerely,        Jose Sanabria MD        CC: MD Daniele Mosquera Thomas Mark, MD  08/21/23 1142  Sign when Signing Visit  4/27/2023    Blas James  1960    Chief Complaint:  splenic artery aneurysm      HPI:      PCP:  Trell Brooks MD    63 y.o. male with HTN(stable, increased risk stroke, rupture) and Obesity(uncontrolled, increased risk cardiovascular events), splenic artery aneurysm(new, increase risk rupture), anemia(stable).  never smoked.  Asymptomatic splenic artery aneurysm.  CT imaging also shows small LEFT renal mass..  No TIA stroke amaurosis.  No MI claudication. No other associated signs, symptoms or modifying factors.    3/2023 KUB:  Eggshell calcification left upper quadrant may represent splenic  artery aneurysm:    4/2023 CT Abdomen Pelvis:  no splenic artery aneurysm.  14mm exophytic LEFT renal mass.    The following portions of the patient's history were reviewed and updated as appropriate: allergies, current medications, past family history, past medical history, past social history, past surgical history and problem list.  Recent images independently reviewed.  Available laboratory values reviewed.    PMH:  Past Medical History:   Diagnosis Date    Hyperlipidemia     Seasonal allergies      Past Surgical History:   Procedure Laterality Date    COLONOSCOPY N/A 1/17/2023    Procedure: COLONOSCOPY;  Surgeon: Carlton Olson MD;  Location: [facility] ENDOSCOPY;  Service: Gastroenterology;  Laterality:  N/A;    ENDOSCOPY N/A 1/17/2023    Procedure: ESOPHAGOGASTRODUODENOSCOPY;  Surgeon: Carlton Olson MD;  Location: [facility] ENDOSCOPY;  Service: Gastroenterology;  Laterality: N/A;    GASTRIC BYPASS       No family history on file.  Social History     Tobacco Use    Smoking status: Never     Passive exposure: Never    Smokeless tobacco: Never   Vaping Use    Vaping Use: Never used   Substance Use Topics    Alcohol use: Yes     Alcohol/week: 2.0 standard drinks     Types: 1 Cans of beer, 1 Shots of liquor per week    Drug use: Never       ALLERGIES:  No Known Allergies      MEDICATIONS:    Current Outpatient Medications:     Calcium Citrate (CITRACAL PO), Take 1,200 mg by mouth., Disp: , Rfl:     ferrous sulfate 325 (65 FE) MG tablet, Take 1 tablet by mouth Daily With Breakfast., Disp: , Rfl:     LORATADINE ALLERGY RELIEF PO, Take  by mouth., Disp: , Rfl:     multivitamin with minerals tablet tablet, Take 1 tablet by mouth Daily., Disp: , Rfl:     Vitamin D, Cholecalciferol, (CHOLECALCIFEROL) 10 MCG (400 UNIT) tablet, Take 1 tablet by mouth Daily., Disp: , Rfl:     amLODIPine (NORVASC) 10 MG tablet, Take 1 tablet by mouth Daily., Disp: 90 tablet, Rfl: 3    folic acid (FOLVITE) 1 MG tablet, Take 1 tablet by mouth Daily., Disp: 90 tablet, Rfl: 1    nebivolol (Bystolic) 10 MG tablet, Take 0.5-1 tablets by mouth Daily., Disp: 90 tablet, Rfl: 3    olmesartan (BENICAR) 40 MG tablet, Take 1 tablet by mouth Daily., Disp: 90 tablet, Rfl: 3    vitamin B-12 (CYANOCOBALAMIN) 1000 MCG tablet, Take 1 tablet by mouth Daily., Disp: 90 tablet, Rfl: 1    Review of Systems   Review of Systems   Constitutional: Positive for malaise/fatigue. Negative for weight loss.   Cardiovascular:  Negative for chest pain, claudication and dyspnea on exertion.   Respiratory:  Negative for cough and shortness of breath.    Skin:  Negative for color change and poor wound healing.   Neurological:  Negative for dizziness, numbness and  "weakness.     Physical Exam   Vitals:    04/27/23 1426 04/27/23 1427   BP: 162/92 160/94   BP Location: Left arm Right arm   Pulse: 61    Temp: 97.8 øF (36.6 øC)    TempSrc: Infrared    SpO2: 98%    Weight: 106 kg (234 lb)    Height: 177.8 cm (70\")      Body surface area is 2.23 meters squared.  Body mass index is 33.58 kg/mý.  Physical Exam  Constitutional:       General: He is not in acute distress.     Appearance: He is not ill-appearing.   HENT:      Right Ear: Hearing normal.      Left Ear: Hearing normal.      Nose: No nasal deformity.      Mouth/Throat:      Dentition: Normal dentition. Does not have dentures.   Cardiovascular:      Rate and Rhythm: Normal rate and regular rhythm.      Pulses:           Carotid pulses are 2+ on the right side and 2+ on the left side.       Radial pulses are 2+ on the right side and 2+ on the left side.        Dorsalis pedis pulses are 2+ on the right side and 2+ on the left side.        Posterior tibial pulses are 2+ on the right side and 2+ on the left side.      Heart sounds: No murmur heard.  Pulmonary:      Effort: Pulmonary effort is normal.      Breath sounds: Normal breath sounds.   Abdominal:      General: There is no distension.      Palpations: Abdomen is soft. There is no mass.      Tenderness: There is no abdominal tenderness.   Musculoskeletal:         General: No deformity.      Comments: Gait normal.    Skin:     General: Skin is warm and dry.      Coloration: Skin is not pale.      Findings: No erythema.      Comments: No venous staining   Neurological:      Mental Status: He is alert and oriented to person, place, and time.   Psychiatric:         Speech: Speech normal.         Behavior: Behavior is cooperative.         Thought Content: Thought content normal.         Judgment: Judgment normal.       BUN   Date Value Ref Range Status   06/06/2023 23 8 - 23 mg/dL Final     Creatinine   Date Value Ref Range Status   06/06/2023 0.96 0.76 - 1.27 mg/dL Final "       ASSESSMENT:  Diagnoses and all orders for this visit:    1. Splenic artery aneurysm (Primary)    2. History of gastric bypass    3. Class 1 obesity due to excess calories with serious comorbidity and body mass index (BMI) of 34.0 to 34.9 in adult    4. Benign essential HTN    5. Renal mass  -     Ambulatory Referral to Urology    PLAN:  Detailed discussion with Blas James regarding situation and options.  No splenic artery aneurysm.  Small LEFT renal mass, Urology evaluation.  Multiple risk factors with severe comorbidities.  No intervention indicated at this time.  Will follow with interval imaging.  Risks, benefits discussed.  Understands and wishes to proceed with plan.    Urology referral, Dr Howard.    Return after above studies complete  Obesity Class  1. Increased risk cardiovascular events, sleep and breathing disorders, joint issues, type 2 diabetes mellitus. Options for weight management, heart healthy diet, exercise programs, and associated health risks of obesity discussed.    Recommended regular physical activity, progressive walking program.  Continue current medications as directed.  Will Obtain relevant old records.  Advance Care Planning   ACP discussion was declined by the patient. Patient does not have an advance directive, declines further assistance.     Thank you for the opportunity to participate in this patient's care.    Copy to primary care provider.

## 2023-04-28 ENCOUNTER — INFUSION (OUTPATIENT)
Dept: ONCOLOGY | Facility: HOSPITAL | Age: 63
End: 2023-04-28
Payer: COMMERCIAL

## 2023-04-28 VITALS
HEART RATE: 57 BPM | RESPIRATION RATE: 18 BRPM | TEMPERATURE: 97.5 F | SYSTOLIC BLOOD PRESSURE: 166 MMHG | DIASTOLIC BLOOD PRESSURE: 77 MMHG

## 2023-04-28 DIAGNOSIS — K90.9 IRON MALABSORPTION: Primary | ICD-10-CM

## 2023-04-28 DIAGNOSIS — Z98.84 HISTORY OF GASTRIC BYPASS: ICD-10-CM

## 2023-04-28 DIAGNOSIS — D50.0 IRON DEFICIENCY ANEMIA DUE TO CHRONIC BLOOD LOSS: ICD-10-CM

## 2023-04-28 PROCEDURE — 25010000002 IRON SUCROSE PER 1 MG: Performed by: INTERNAL MEDICINE

## 2023-04-28 PROCEDURE — 63710000001 DIPHENHYDRAMINE PER 50 MG: Performed by: INTERNAL MEDICINE

## 2023-04-28 RX ORDER — DIPHENHYDRAMINE HYDROCHLORIDE 50 MG/ML
50 INJECTION INTRAMUSCULAR; INTRAVENOUS AS NEEDED
OUTPATIENT
Start: 2023-04-29

## 2023-04-28 RX ORDER — ACETAMINOPHEN 325 MG/1
650 TABLET ORAL ONCE
OUTPATIENT
Start: 2023-04-29

## 2023-04-28 RX ORDER — ACETAMINOPHEN 325 MG/1
650 TABLET ORAL ONCE
Status: COMPLETED | OUTPATIENT
Start: 2023-04-28 | End: 2023-04-28

## 2023-04-28 RX ORDER — FAMOTIDINE 10 MG/ML
20 INJECTION, SOLUTION INTRAVENOUS AS NEEDED
OUTPATIENT
Start: 2023-04-29

## 2023-04-28 RX ORDER — DIPHENHYDRAMINE HCL 25 MG
25 CAPSULE ORAL ONCE
OUTPATIENT
Start: 2023-04-29

## 2023-04-28 RX ORDER — SODIUM CHLORIDE 9 MG/ML
250 INJECTION, SOLUTION INTRAVENOUS ONCE
OUTPATIENT
Start: 2023-04-29

## 2023-04-28 RX ORDER — METHYLPREDNISOLONE SODIUM SUCCINATE 125 MG/2ML
125 INJECTION, POWDER, LYOPHILIZED, FOR SOLUTION INTRAMUSCULAR; INTRAVENOUS AS NEEDED
Start: 2023-04-29

## 2023-04-28 RX ORDER — FAMOTIDINE 10 MG/ML
20 INJECTION, SOLUTION INTRAVENOUS AS NEEDED
Status: DISCONTINUED | OUTPATIENT
Start: 2023-04-28 | End: 2023-04-28 | Stop reason: HOSPADM

## 2023-04-28 RX ORDER — DIPHENHYDRAMINE HCL 25 MG
25 CAPSULE ORAL ONCE
Status: COMPLETED | OUTPATIENT
Start: 2023-04-28 | End: 2023-04-28

## 2023-04-28 RX ORDER — FAMOTIDINE 20 MG/1
20 TABLET, FILM COATED ORAL ONCE
OUTPATIENT
Start: 2023-04-29

## 2023-04-28 RX ORDER — FAMOTIDINE 20 MG/1
20 TABLET, FILM COATED ORAL ONCE
Status: COMPLETED | OUTPATIENT
Start: 2023-04-28 | End: 2023-04-28

## 2023-04-28 RX ORDER — SODIUM CHLORIDE 9 MG/ML
250 INJECTION, SOLUTION INTRAVENOUS ONCE
Status: COMPLETED | OUTPATIENT
Start: 2023-04-28 | End: 2023-04-28

## 2023-04-28 RX ORDER — DIPHENHYDRAMINE HYDROCHLORIDE 50 MG/ML
50 INJECTION INTRAMUSCULAR; INTRAVENOUS AS NEEDED
Status: DISCONTINUED | OUTPATIENT
Start: 2023-04-28 | End: 2023-04-28 | Stop reason: HOSPADM

## 2023-04-28 RX ADMIN — ACETAMINOPHEN 650 MG: 325 TABLET ORAL at 08:16

## 2023-04-28 RX ADMIN — IRON SUCROSE 200 MG: 20 INJECTION, SOLUTION INTRAVENOUS at 08:51

## 2023-04-28 RX ADMIN — FAMOTIDINE 20 MG: 20 TABLET, FILM COATED ORAL at 08:16

## 2023-04-28 RX ADMIN — SODIUM CHLORIDE 250 ML: 9 INJECTION, SOLUTION INTRAVENOUS at 08:16

## 2023-04-28 RX ADMIN — DIPHENHYDRAMINE HYDROCHLORIDE 25 MG: 25 CAPSULE ORAL at 08:16

## 2023-05-02 ENCOUNTER — INFUSION (OUTPATIENT)
Dept: ONCOLOGY | Facility: HOSPITAL | Age: 63
End: 2023-05-02
Payer: COMMERCIAL

## 2023-05-02 VITALS
DIASTOLIC BLOOD PRESSURE: 94 MMHG | HEART RATE: 54 BPM | OXYGEN SATURATION: 97 % | TEMPERATURE: 98.6 F | RESPIRATION RATE: 16 BRPM | SYSTOLIC BLOOD PRESSURE: 204 MMHG

## 2023-05-02 RX ORDER — NEBIVOLOL 5 MG/1
5 TABLET ORAL DAILY
Qty: 30 TABLET | Refills: 0 | Status: SHIPPED | OUTPATIENT
Start: 2023-05-02

## 2023-05-02 RX ORDER — OLMESARTAN MEDOXOMIL 40 MG/1
40 TABLET ORAL DAILY
Qty: 30 TABLET | Refills: 0 | Status: SHIPPED | OUTPATIENT
Start: 2023-05-02

## 2023-05-04 ENCOUNTER — INFUSION (OUTPATIENT)
Dept: ONCOLOGY | Facility: HOSPITAL | Age: 63
End: 2023-05-04
Payer: COMMERCIAL

## 2023-05-04 VITALS
SYSTOLIC BLOOD PRESSURE: 182 MMHG | TEMPERATURE: 97 F | RESPIRATION RATE: 18 BRPM | HEART RATE: 58 BPM | DIASTOLIC BLOOD PRESSURE: 85 MMHG

## 2023-05-04 DIAGNOSIS — D50.0 IRON DEFICIENCY ANEMIA DUE TO CHRONIC BLOOD LOSS: ICD-10-CM

## 2023-05-04 DIAGNOSIS — Z98.84 HISTORY OF GASTRIC BYPASS: ICD-10-CM

## 2023-05-04 DIAGNOSIS — K90.9 IRON MALABSORPTION: Primary | ICD-10-CM

## 2023-05-04 PROCEDURE — 25010000002 IRON SUCROSE PER 1 MG: Performed by: INTERNAL MEDICINE

## 2023-05-04 PROCEDURE — 63710000001 DIPHENHYDRAMINE PER 50 MG: Performed by: INTERNAL MEDICINE

## 2023-05-04 RX ORDER — SODIUM CHLORIDE 9 MG/ML
250 INJECTION, SOLUTION INTRAVENOUS ONCE
Status: COMPLETED | OUTPATIENT
Start: 2023-05-04 | End: 2023-05-04

## 2023-05-04 RX ORDER — FAMOTIDINE 20 MG/1
20 TABLET, FILM COATED ORAL ONCE
Status: COMPLETED | OUTPATIENT
Start: 2023-05-04 | End: 2023-05-04

## 2023-05-04 RX ORDER — DIPHENHYDRAMINE HCL 25 MG
25 CAPSULE ORAL ONCE
Status: COMPLETED | OUTPATIENT
Start: 2023-05-04 | End: 2023-05-04

## 2023-05-04 RX ORDER — FAMOTIDINE 10 MG/ML
20 INJECTION, SOLUTION INTRAVENOUS AS NEEDED
Status: CANCELLED | OUTPATIENT
Start: 2023-05-06

## 2023-05-04 RX ORDER — ACETAMINOPHEN 325 MG/1
650 TABLET ORAL ONCE
Status: COMPLETED | OUTPATIENT
Start: 2023-05-04 | End: 2023-05-04

## 2023-05-04 RX ORDER — FAMOTIDINE 20 MG/1
20 TABLET, FILM COATED ORAL ONCE
Status: CANCELLED | OUTPATIENT
Start: 2023-05-06

## 2023-05-04 RX ORDER — DIPHENHYDRAMINE HYDROCHLORIDE 50 MG/ML
50 INJECTION INTRAMUSCULAR; INTRAVENOUS AS NEEDED
Status: DISCONTINUED | OUTPATIENT
Start: 2023-05-04 | End: 2023-05-04 | Stop reason: HOSPADM

## 2023-05-04 RX ORDER — FAMOTIDINE 10 MG/ML
20 INJECTION, SOLUTION INTRAVENOUS AS NEEDED
Status: DISCONTINUED | OUTPATIENT
Start: 2023-05-04 | End: 2023-05-04 | Stop reason: HOSPADM

## 2023-05-04 RX ORDER — METHYLPREDNISOLONE SODIUM SUCCINATE 125 MG/2ML
125 INJECTION, POWDER, LYOPHILIZED, FOR SOLUTION INTRAMUSCULAR; INTRAVENOUS AS NEEDED
Status: CANCELLED
Start: 2023-05-06

## 2023-05-04 RX ORDER — ACETAMINOPHEN 325 MG/1
650 TABLET ORAL ONCE
Status: CANCELLED | OUTPATIENT
Start: 2023-05-06

## 2023-05-04 RX ORDER — DIPHENHYDRAMINE HYDROCHLORIDE 50 MG/ML
50 INJECTION INTRAMUSCULAR; INTRAVENOUS AS NEEDED
Status: CANCELLED | OUTPATIENT
Start: 2023-05-06

## 2023-05-04 RX ORDER — DIPHENHYDRAMINE HCL 25 MG
25 CAPSULE ORAL ONCE
Status: CANCELLED | OUTPATIENT
Start: 2023-05-06

## 2023-05-04 RX ORDER — SODIUM CHLORIDE 9 MG/ML
250 INJECTION, SOLUTION INTRAVENOUS ONCE
Status: CANCELLED | OUTPATIENT
Start: 2023-05-06

## 2023-05-04 RX ADMIN — IRON SUCROSE 200 MG: 20 INJECTION, SOLUTION INTRAVENOUS at 09:32

## 2023-05-04 RX ADMIN — ACETAMINOPHEN 650 MG: 325 TABLET ORAL at 09:01

## 2023-05-04 RX ADMIN — DIPHENHYDRAMINE HYDROCHLORIDE 25 MG: 25 CAPSULE ORAL at 09:01

## 2023-05-04 RX ADMIN — FAMOTIDINE 20 MG: 20 TABLET ORAL at 09:01

## 2023-05-04 RX ADMIN — SODIUM CHLORIDE 250 ML: 9 INJECTION, SOLUTION INTRAVENOUS at 09:01

## 2023-05-08 ENCOUNTER — INFUSION (OUTPATIENT)
Dept: ONCOLOGY | Facility: HOSPITAL | Age: 63
End: 2023-05-08
Payer: COMMERCIAL

## 2023-05-08 VITALS
HEART RATE: 48 BPM | TEMPERATURE: 97.4 F | DIASTOLIC BLOOD PRESSURE: 86 MMHG | RESPIRATION RATE: 18 BRPM | SYSTOLIC BLOOD PRESSURE: 184 MMHG

## 2023-05-08 DIAGNOSIS — Z98.84 HISTORY OF GASTRIC BYPASS: ICD-10-CM

## 2023-05-08 DIAGNOSIS — K90.9 IRON MALABSORPTION: Primary | ICD-10-CM

## 2023-05-08 DIAGNOSIS — D50.0 IRON DEFICIENCY ANEMIA DUE TO CHRONIC BLOOD LOSS: ICD-10-CM

## 2023-05-08 PROCEDURE — 63710000001 DIPHENHYDRAMINE PER 50 MG: Performed by: INTERNAL MEDICINE

## 2023-05-08 PROCEDURE — 25010000002 IRON SUCROSE PER 1 MG: Performed by: INTERNAL MEDICINE

## 2023-05-08 RX ORDER — FAMOTIDINE 10 MG/ML
20 INJECTION, SOLUTION INTRAVENOUS AS NEEDED
Status: DISCONTINUED | OUTPATIENT
Start: 2023-05-08 | End: 2023-05-08

## 2023-05-08 RX ORDER — FAMOTIDINE 20 MG/1
20 TABLET, FILM COATED ORAL ONCE
Status: CANCELLED | OUTPATIENT
Start: 2023-05-08

## 2023-05-08 RX ORDER — SODIUM CHLORIDE 9 MG/ML
250 INJECTION, SOLUTION INTRAVENOUS ONCE
Status: CANCELLED | OUTPATIENT
Start: 2023-05-08

## 2023-05-08 RX ORDER — DIPHENHYDRAMINE HCL 25 MG
25 CAPSULE ORAL ONCE
Status: CANCELLED | OUTPATIENT
Start: 2023-05-08

## 2023-05-08 RX ORDER — SODIUM CHLORIDE 9 MG/ML
250 INJECTION, SOLUTION INTRAVENOUS ONCE
Status: COMPLETED | OUTPATIENT
Start: 2023-05-08 | End: 2023-05-08

## 2023-05-08 RX ORDER — DIPHENHYDRAMINE HYDROCHLORIDE 50 MG/ML
50 INJECTION INTRAMUSCULAR; INTRAVENOUS AS NEEDED
Status: CANCELLED | OUTPATIENT
Start: 2023-05-08

## 2023-05-08 RX ORDER — DIPHENHYDRAMINE HYDROCHLORIDE 50 MG/ML
50 INJECTION INTRAMUSCULAR; INTRAVENOUS AS NEEDED
Status: DISCONTINUED | OUTPATIENT
Start: 2023-05-08 | End: 2023-05-08

## 2023-05-08 RX ORDER — METHYLPREDNISOLONE SODIUM SUCCINATE 125 MG/2ML
125 INJECTION, POWDER, LYOPHILIZED, FOR SOLUTION INTRAMUSCULAR; INTRAVENOUS AS NEEDED
Status: CANCELLED
Start: 2023-05-08

## 2023-05-08 RX ORDER — FAMOTIDINE 10 MG/ML
20 INJECTION, SOLUTION INTRAVENOUS AS NEEDED
Status: CANCELLED | OUTPATIENT
Start: 2023-05-08

## 2023-05-08 RX ORDER — FAMOTIDINE 20 MG/1
20 TABLET, FILM COATED ORAL ONCE
Status: COMPLETED | OUTPATIENT
Start: 2023-05-08 | End: 2023-05-08

## 2023-05-08 RX ORDER — ACETAMINOPHEN 325 MG/1
650 TABLET ORAL ONCE
Status: CANCELLED | OUTPATIENT
Start: 2023-05-08

## 2023-05-08 RX ORDER — DIPHENHYDRAMINE HCL 25 MG
25 CAPSULE ORAL ONCE
Status: COMPLETED | OUTPATIENT
Start: 2023-05-08 | End: 2023-05-08

## 2023-05-08 RX ORDER — ACETAMINOPHEN 325 MG/1
650 TABLET ORAL ONCE
Status: COMPLETED | OUTPATIENT
Start: 2023-05-08 | End: 2023-05-08

## 2023-05-08 RX ADMIN — ACETAMINOPHEN 650 MG: 325 TABLET ORAL at 11:14

## 2023-05-08 RX ADMIN — IRON SUCROSE 200 MG: 20 INJECTION, SOLUTION INTRAVENOUS at 11:45

## 2023-05-08 RX ADMIN — SODIUM CHLORIDE 250 ML: 9 INJECTION, SOLUTION INTRAVENOUS at 11:14

## 2023-05-08 RX ADMIN — FAMOTIDINE 20 MG: 20 TABLET, FILM COATED ORAL at 11:15

## 2023-05-08 RX ADMIN — DIPHENHYDRAMINE HYDROCHLORIDE 25 MG: 25 CAPSULE ORAL at 11:15

## 2023-05-11 ENCOUNTER — HOSPITAL ENCOUNTER (OUTPATIENT)
Dept: GASTROENTEROLOGY | Facility: HOSPITAL | Age: 63
Discharge: HOME | End: 2023-05-11
Payer: COMMERCIAL

## 2023-05-11 DIAGNOSIS — D50.8 OTHER IRON DEFICIENCY ANEMIA: ICD-10-CM

## 2023-05-11 PROCEDURE — C1889 IMPLANT/INSERT DEVICE, NOC: HCPCS

## 2023-05-11 PROCEDURE — 91110 GI TRC IMG INTRAL ESOPH-ILE: CPT

## 2023-05-11 NOTE — NURSING NOTE
Room arrival time_____________0800__________________    Does patient have a pacemaker or implantable device (if yes, this is contraindicated) :   no    B/P: 202/110    Heart Rate: 63    O2 Sat %: 97    Temp: 98.1      Pain: 0            If yes, location and VAS _____________________________    Allergies: nka  NPO status: 5/10/23  Pillcam Lot# : [id number]  Pillcam expiration date: 4/30/2024    Patient swallowed capsule @ ____0802_________      Patient arrived to Endoscopy department ambulatory, alert and oriented.  Procedure explained to patient, patient verbalized understanding.  Consent obtained.  Patient swallowed capsule without difficulty.  Dietary instructions given and patient instructed on time to return to the Endoscopy department.    Discharged from endo time_________0805________________

## 2023-05-21 NOTE — PROGRESS NOTES
DATE OF VISIT: 5/24/2023      REASON FOR VISIT: Iron deficiency anemia, history of gastric bypass, iron malabsorption      HISTORY OF PRESENT ILLNESS:   62-year-old male with medical problems significant for hypertension, history of gastric bypass in 2007, chronic neck pain and back pain who was initially seen in consultation on January 11, 2022 for iron deficiency anemia.  Due to iron malabsorption patient has received intravenous Venofer that completed on May 8, 2023.  Continues to have fatigue.  Denies any new lymph node enlargement.  Complains of intermittent tingling and numbness affecting upper extremity.  Denies any fevers.              Past Medical History, Past Surgical History, Social History, Family History have been reviewed and are without significant changes except as mentioned.    Review of Systems   A comprehensive 14 point review of systems was performed and was negative except as mentioned in HPI.    Medications:  The current medication list was reviewed in the EMR    ALLERGIES:  No Known Allergies    Objective      Vitals:    05/24/23 1327   BP: 159/82   Pulse: 55   Resp: 18   Temp: 98.4 °F (36.9 °C)   SpO2: 97%   Weight: 107 kg (235 lb 6.4 oz)   PainSc: 2  Comment: back + neck         5/8/2023    11:03 AM   Current Status   ECOG score 0       Physical Exam  Pulmonary:      Breath sounds: Normal breath sounds.   Neurological:      Mental Status: He is alert and oriented to person, place, and time.           RECENT LABS:  Creatinine   Date Value Ref Range Status   04/26/2023 1.03 0.76 - 1.27 mg/dL Final     BUN   Date Value Ref Range Status   04/26/2023 18 8 - 23 mg/dL Final     Lab Results   Component Value Date    WBC 7.84 05/24/2023    HGB 14.2 05/24/2023    HCT 43.9 05/24/2023    MCV 89.2 05/24/2023     05/24/2023     Lab Results   Component Value Date    NEUTROABS 3.99 05/24/2023    IRON 49 (L) 05/24/2023    IRON 31 (L) 03/15/2023    IRON 13 (L) 01/11/2023    TIBC 393 05/24/2023    TIBC  481 03/15/2023    TIBC 538 (H) 2023    LABIRON 12 (L) 2023    LABIRON 6 (L) 03/15/2023    LABIRON 2 (L) 2023    FERRITIN 138.90 2023    FERRITIN 10.84 (L) 03/15/2023    FERRITIN 5.56 (L) 2023    QRHYCWMK68 795 03/15/2023    QSQKQNJA92 497 2023    FOLATE 13.10 03/15/2023    FOLATE 6.18 2023     Lab Results   Component Value Date    REFLABREPO  2023     [facility]  [address] [place] [address]  Phone: [phone] or [phone]  Fax: [phone]  Jamie Mcdonald M.D., Medical Director    PATIENT NAME                           LABORATORY NO.  [id number]  [address].                [id number]  [id number]                         AGE              SEX  SSN           CLIENT REF #  [facility]           62      1960      [id number]   [id number]    [place]                       REQUESTING M.D.     ATTENDING M.D.     COPY TO[address]                 [facility]  DATE COLLECTED      DATE RECEIVED      DATE REPORTED  2023    DIAGNOSIS:  A.   SMALL BOWEL, BIOPSY:  Small intestinal mucosa with no significant histopathologic abnormalities  B.   GASTRIC BIOPSY:  Gastric antral type mucosa with reactive (chemical) gastropathy  Negative for intestinal metaplasia or dysplasia  No Helicobacter pylori-like organisms seen  C.   GE JUNCTION:  Squamous mucosa with features suggestive of reflux esophagitis (no  eosinophils seen)  Glandular mucosa with mild chronic inflammation  Negative for intestinal metaplasia or dysplasia  Negative for specific microorganisms  D.   CECUM POLYP:  Adenomatous polyp (tubular adenoma)  Negative for high-grade dysplasia    SUSIE    CLINICAL HISTORY:  History of gastric bypass, other iron deficiency anemia    SPECIMENS RECEIVED:  A.  SMALL BOWEL, BIOPSY  B.  GASTRIC  "BIOPSY  C.  GE JUNCTION  D.  CECUM POLYP    MICROSCOPIC DESCRIPTION:  Tissue blocks are prepared and slides are examined microscopically on all  specimens. See diagnosis for details.    Professional interpretation rendered by Silvano Jay M.D., DEBRAP. at [employer], [address] [place] [address].    GROSS DESCRIPTION:  A.  The specimen is received in 1 formalin filled container labeled \"small  bowel biopsy\" and consists of multiple pieces of tan soft tissue measuring  0.5 x 0.3 x 0.2 cm in aggregate.  The specimen is submitted entirely in 1  cassette.  KAMI  B.  Specimen is received in 1 formalin filled container labeled \"gastric biopsy\"  and consists of 3 pieces of tan soft tissue measuring 0.4 x 0.3 x 0.2 cm in  aggregate.  The specimen is submitted entirely in 1 cassette.  C.  Specimen is received in 1 formalin filled container labeled \"EG junction  biopsy\" and consists of multiple pieces of tan soft tissue measuring 0.3 x  0.3 x 0.2 cm in aggregate.  The specimen is submitted entirely in 1  cassette.  D.  Specimen is received in 1 formalin filled container labeled \"cecum polyp\"  and consists of 1 piece of tan soft tissue measuring 0.3 x 0.2 x 0.2 cm.  Specimen is submitted entirely in 1 cassette.    REVIEWED, DIAGNOSED AND ELECTRONICALLY  SIGNED BY:    Silvano Jay M.D., F.MARY.A.P.  CPT CODES:  88305x4           PATHOLOGY:  * Cannot find OR log *         RADIOLOGY DATA :  No radiology results for the last 7 days        Assessment & Plan     1.  Iron deficiency anemia:  - Patient had EGD and colonoscopy on January 17, 2023 that showed hiatal hernia esophagitis and gastritis without any bleeding.  Colonoscopy showed 5 mm polyp without any active bleeding  - Patient had capsule endoscopy done on May 11, 2023, results are pending at this point.  - Due to iron malabsorption patient received intravenous Venofer that completed on May 8, 2023  - Anemia work-up done today shows hemoglobin is 14.2.  Iron " saturation is 14% with ferritin of 138.  Due to iron malabsorption recommend restarting intravenous Venofer starting next week.  Side effect of Venofer including allergic reaction were discussed with patient today  - Patient is currently on B12 and folic acid p.o. daily.  - We will have patient return to clinic in 2 months with repeat CBC, iron studies, ferritin, B12 and folate to be done on that day.    2.  History of gastric bypass    3.  Hypertension    4.  Health maintenance: Patient does not smoke.  Had a colonoscopy in January 2023    5.  Advance care planning:  - Patient remains full code.                 PHQ-9 Total Score: 0   -Patient is not homicidal or suicidal.  No acute intervention required.    Blas James reports a pain score of 2.  Given his pain assessment as noted, treatment options were discussed and the following options were decided upon as a follow-up plan to address the patient's pain: continuation of current treatment plan for pain.         Awais Malave MD  5/24/2023  13:58 CDT        Part of this note may be an electronic transcription/translation of spoken language to printed text using the Dragon Dictation System.          CC:

## 2023-05-23 ENCOUNTER — OFFICE VISIT (OUTPATIENT)
Dept: GASTROENTEROLOGY | Facility: CLINIC | Age: 63
End: 2023-05-23
Payer: COMMERCIAL

## 2023-05-23 VITALS
OXYGEN SATURATION: 98 % | BODY MASS INDEX: 33.64 KG/M2 | HEIGHT: 70 IN | SYSTOLIC BLOOD PRESSURE: 140 MMHG | DIASTOLIC BLOOD PRESSURE: 86 MMHG | WEIGHT: 235 LBS | HEART RATE: 46 BPM

## 2023-05-23 DIAGNOSIS — R79.89 ELEVATED LFTS: ICD-10-CM

## 2023-05-23 DIAGNOSIS — D50.8 OTHER IRON DEFICIENCY ANEMIA: Primary | ICD-10-CM

## 2023-05-24 ENCOUNTER — OFFICE VISIT (OUTPATIENT)
Dept: ONCOLOGY | Facility: CLINIC | Age: 63
End: 2023-05-24
Payer: COMMERCIAL

## 2023-05-24 ENCOUNTER — LAB (OUTPATIENT)
Dept: ONCOLOGY | Facility: HOSPITAL | Age: 63
End: 2023-05-24
Payer: COMMERCIAL

## 2023-05-24 VITALS
BODY MASS INDEX: 33.78 KG/M2 | RESPIRATION RATE: 18 BRPM | WEIGHT: 235.4 LBS | SYSTOLIC BLOOD PRESSURE: 159 MMHG | OXYGEN SATURATION: 97 % | DIASTOLIC BLOOD PRESSURE: 82 MMHG | TEMPERATURE: 98.4 F | HEART RATE: 55 BPM

## 2023-05-24 DIAGNOSIS — K90.9 IRON MALABSORPTION: ICD-10-CM

## 2023-05-24 DIAGNOSIS — E55.9 VITAMIN D DEFICIENCY: ICD-10-CM

## 2023-05-24 DIAGNOSIS — Z98.84 HISTORY OF GASTRIC BYPASS: ICD-10-CM

## 2023-05-24 DIAGNOSIS — Z98.84 HISTORY OF GASTRIC BYPASS: Chronic | ICD-10-CM

## 2023-05-24 DIAGNOSIS — D50.0 IRON DEFICIENCY ANEMIA DUE TO CHRONIC BLOOD LOSS: Primary | Chronic | ICD-10-CM

## 2023-05-24 DIAGNOSIS — E21.3 HYPERPARATHYROIDISM: ICD-10-CM

## 2023-05-24 DIAGNOSIS — K90.9 IRON MALABSORPTION: Chronic | ICD-10-CM

## 2023-05-24 DIAGNOSIS — D50.0 IRON DEFICIENCY ANEMIA DUE TO CHRONIC BLOOD LOSS: ICD-10-CM

## 2023-05-24 LAB
25(OH)D3 SERPL-MCNC: 33.7 NG/ML (ref 30–100)
BASOPHILS # BLD AUTO: 0.06 10*3/MM3 (ref 0–0.2)
BASOPHILS NFR BLD AUTO: 0.8 % (ref 0–1.5)
DEPRECATED RDW RBC AUTO: 61.3 FL (ref 37–54)
EOSINOPHIL # BLD AUTO: 0.16 10*3/MM3 (ref 0–0.4)
EOSINOPHIL NFR BLD AUTO: 2 % (ref 0.3–6.2)
ERYTHROCYTE [DISTWIDTH] IN BLOOD BY AUTOMATED COUNT: 18.8 % (ref 12.3–15.4)
FERRITIN SERPL-MCNC: 138.9 NG/ML (ref 30–400)
FOLATE SERPL-MCNC: 15.6 NG/ML (ref 4.78–24.2)
HCT VFR BLD AUTO: 43.9 % (ref 37.5–51)
HGB BLD-MCNC: 14.2 G/DL (ref 13–17.7)
IMM GRANULOCYTES # BLD AUTO: 0.03 10*3/MM3 (ref 0–0.05)
IMM GRANULOCYTES NFR BLD AUTO: 0.4 % (ref 0–0.5)
IRON 24H UR-MRATE: 49 MCG/DL (ref 59–158)
IRON SATN MFR SERPL: 12 % (ref 20–50)
LYMPHOCYTES # BLD AUTO: 3.07 10*3/MM3 (ref 0.7–3.1)
LYMPHOCYTES NFR BLD AUTO: 39.2 % (ref 19.6–45.3)
MCH RBC QN AUTO: 28.9 PG (ref 26.6–33)
MCHC RBC AUTO-ENTMCNC: 32.3 G/DL (ref 31.5–35.7)
MCV RBC AUTO: 89.2 FL (ref 79–97)
MONOCYTES # BLD AUTO: 0.53 10*3/MM3 (ref 0.1–0.9)
MONOCYTES NFR BLD AUTO: 6.8 % (ref 5–12)
NEUTROPHILS NFR BLD AUTO: 3.99 10*3/MM3 (ref 1.7–7)
NEUTROPHILS NFR BLD AUTO: 50.8 % (ref 42.7–76)
NRBC BLD AUTO-RTO: 0 /100 WBC (ref 0–0.2)
PLATELET # BLD AUTO: 184 10*3/MM3 (ref 140–450)
PMV BLD AUTO: 10.2 FL (ref 6–12)
PTH-INTACT SERPL-MCNC: 102.2 PG/ML (ref 15–65)
RBC # BLD AUTO: 4.92 10*6/MM3 (ref 4.14–5.8)
TIBC SERPL-MCNC: 393 MCG/DL (ref 298–536)
TRANSFERRIN SERPL-MCNC: 264 MG/DL (ref 200–360)
VIT B12 BLD-MCNC: 1170 PG/ML (ref 211–946)
WBC NRBC COR # BLD: 7.84 10*3/MM3 (ref 3.4–10.8)

## 2023-05-24 RX ORDER — FAMOTIDINE 10 MG/ML
20 INJECTION, SOLUTION INTRAVENOUS AS NEEDED
OUTPATIENT
Start: 2023-05-31

## 2023-05-24 RX ORDER — DIPHENHYDRAMINE HYDROCHLORIDE 50 MG/ML
50 INJECTION INTRAMUSCULAR; INTRAVENOUS AS NEEDED
OUTPATIENT
Start: 2023-05-31

## 2023-05-24 RX ORDER — SODIUM CHLORIDE 9 MG/ML
250 INJECTION, SOLUTION INTRAVENOUS ONCE
OUTPATIENT
Start: 2023-05-31

## 2023-05-24 RX ORDER — DIPHENHYDRAMINE HCL 25 MG
25 CAPSULE ORAL ONCE
OUTPATIENT
Start: 2023-05-31

## 2023-05-24 RX ORDER — ACETAMINOPHEN 325 MG/1
650 TABLET ORAL ONCE
OUTPATIENT
Start: 2023-05-31

## 2023-05-24 RX ORDER — FAMOTIDINE 20 MG/1
20 TABLET, FILM COATED ORAL ONCE
OUTPATIENT
Start: 2023-05-31

## 2023-05-25 ENCOUNTER — TELEPHONE (OUTPATIENT)
Dept: ONCOLOGY | Facility: HOSPITAL | Age: 63
End: 2023-05-25
Payer: COMMERCIAL

## 2023-05-25 DIAGNOSIS — E21.3 HYPERPARATHYROIDISM: Primary | ICD-10-CM

## 2023-05-25 NOTE — TELEPHONE ENCOUNTER
----- Message from Awais Malave MD sent at 5/25/2023  7:00 AM CDT -----  Please let patient know, his B12 and folate level is showing improvement.  He needs to continue taking B12 and folic acid p.o. daily.  Thank you

## 2023-05-27 NOTE — PROGRESS NOTES
Chief Complaint   Patient presents with   • Follow-up     1 month       Subjective    Blas James is a 62 y.o. male.    History of Present Illness  Patient presented to GI clinic for follow-up visit today.  Feels better currently.  Fatigue is improving.  Denied abdominal pain, nausea or vomiting.  Bowel movements regular.  Weight is stable.  Iron saturation 6%.  Does not recall passing the PillCam.  Capsule endoscopy was consistent with gastritis, gastric bypass, polypoid appearance of IC valve, likely prolapse and hiatal hernia.       The following portions of the patient's history were reviewed and updated as appropriate:   Past Medical History:   Diagnosis Date   • Hyperlipidemia    • Seasonal allergies      Past Surgical History:   Procedure Laterality Date   • COLONOSCOPY N/A 1/17/2023    Procedure: COLONOSCOPY;  Surgeon: Carlton Olson MD;  Location: [facility] ENDOSCOPY;  Service: Gastroenterology;  Laterality: N/A;   • ENDOSCOPY N/A 1/17/2023    Procedure: ESOPHAGOGASTRODUODENOSCOPY;  Surgeon: Carlton Olson MD;  Location: [facility] ENDOSCOPY;  Service: Gastroenterology;  Laterality: N/A;   • GASTRIC BYPASS       No family history on file.    Prior to Admission medications    Medication Sig Start Date End Date Taking? Authorizing Provider   Calcium Citrate (CITRACAL PO) Take 1,200 mg by mouth.   Yes Sanford Bedoya MD   ferrous sulfate 325 (65 FE) MG tablet Take 1 tablet by mouth Daily With Breakfast.   Yes Sanford Bedoya MD   folic acid (FOLVITE) 1 MG tablet Take 1 tablet by mouth Daily. 1/11/23  Yes Awais Malave MD   LORATADINE ALLERGY RELIEF PO Take  by mouth.   Yes Sanford Bedoya MD   multivitamin with minerals tablet tablet Take 1 tablet by mouth Daily.   Yes Sanford Bedoya MD   nebivolol (Bystolic) 5 MG tablet Take 1 tablet by mouth Daily. REPLACES METOPROLOL 5/2/23  Yes Merry Eisenberg APRN   olmesartan (Benicar) 40 MG tablet Take 1 tablet by mouth Daily.  REPLACES LISINOPRIL 5/2/23  Yes Merry Eisenberg APRN   vitamin B-12 (CYANOCOBALAMIN) 1000 MCG tablet Take 1 tablet by mouth Daily. 1/11/23  Yes Awais Malave MD   Vitamin D, Cholecalciferol, (CHOLECALCIFEROL) 10 MCG (400 UNIT) tablet Take 1 tablet by mouth Daily.   Yes Provider, MD Sanford     No Known Allergies  Social History     Socioeconomic History   • Marital status:    Tobacco Use   • Smoking status: Never     Passive exposure: Never   • Smokeless tobacco: Never   Vaping Use   • Vaping Use: Never used   Substance and Sexual Activity   • Alcohol use: Yes     Alcohol/week: 2.0 standard drinks     Types: 1 Cans of beer, 1 Shots of liquor per week   • Drug use: Never   • Sexual activity: Defer       Review of Systems  Review of Systems   Constitutional: Positive for fatigue. Negative for chills, fever and unexpected weight change.   HENT: Negative for congestion, ear discharge, hearing loss, nosebleeds and sore throat.    Eyes: Negative for pain, discharge and redness.   Respiratory: Negative for cough, chest tightness, shortness of breath and wheezing.    Cardiovascular: Negative for chest pain and palpitations.   Gastrointestinal: Negative for abdominal distention, abdominal pain, blood in stool, constipation, diarrhea, nausea and vomiting.   Endocrine: Negative for cold intolerance, polydipsia, polyphagia and polyuria.   Genitourinary: Negative for dysuria, flank pain, frequency, hematuria and urgency.   Musculoskeletal: Negative for arthralgias, back pain, joint swelling and myalgias.   Skin: Negative for color change, pallor and rash.   Neurological: Negative for tremors, seizures, syncope, weakness and headaches.   Hematological: Negative for adenopathy. Does not bruise/bleed easily.   Psychiatric/Behavioral: Negative for behavioral problems, confusion, dysphoric mood, hallucinations and suicidal ideas. The patient is not nervous/anxious.         /86 (BP Location: Left arm, Patient  "Position: Sitting, Cuff Size: Adult)   Pulse (!) 46   Ht 177.8 cm (70\")   Wt 107 kg (235 lb)   SpO2 98%   BMI 33.72 kg/m²     Objective    Physical Exam  Constitutional:       Appearance: He is well-developed.   HENT:      Head: Normocephalic and atraumatic.   Eyes:      Conjunctiva/sclera: Conjunctivae normal.      Pupils: Pupils are equal, round, and reactive to light.   Neck:      Thyroid: No thyromegaly.   Cardiovascular:      Rate and Rhythm: Normal rate and regular rhythm.      Heart sounds: Normal heart sounds. No murmur heard.  Pulmonary:      Effort: Pulmonary effort is normal.      Breath sounds: Normal breath sounds. No wheezing.   Abdominal:      General: Bowel sounds are normal. There is no distension.      Palpations: Abdomen is soft. There is no mass.      Tenderness: There is no abdominal tenderness.      Hernia: No hernia is present.   Genitourinary:     Comments: No lesions noted  Musculoskeletal:         General: No tenderness. Normal range of motion.      Cervical back: Normal range of motion and neck supple.   Lymphadenopathy:      Cervical: No cervical adenopathy.   Skin:     General: Skin is warm and dry.      Findings: No rash.   Neurological:      Mental Status: He is alert and oriented to person, place, and time.      Cranial Nerves: No cranial nerve deficit.   Psychiatric:         Thought Content: Thought content normal.       Lab on 04/26/2023   Component Date Value Ref Range Status   • Creatinine 04/26/2023 1.03  0.76 - 1.27 mg/dL Final   • eGFR 04/26/2023 82.6  >60.0 mL/min/1.73 Final   • BUN 04/26/2023 18  8 - 23 mg/dL Final     Assessment & Plan    No diagnosis found..   1.  Iron deficiency anemia, likely due to gastric bypass status.  Continue iron supplements.  Repeat CBC next visit.  2.  Colon polyp, repeat colonoscopy in 3 years.  3.  Obesity s/p gastric bypass, recommend exercise and diet control.  4.  Elevated serum alkaline phosphatase level, likely due to liver disease.  " Recommend strict alcohol cessation.  Obtain LFTs in next visit.    Orders placed during this encounter include:  No orders of the defined types were placed in this encounter.      * Surgery not found *    Review and/or summary of lab tests, radiology, procedures, medications. Review and summary of old records and obtaining of history. The risks and benefits of my recommendations, as well as other treatment options were discussed with the patient today. Questions were answered.    No orders of the defined types were placed in this encounter.      Follow-up: Return in about 1 month (around 6/23/2023).               Results for orders placed or performed in visit on 05/24/23   CBC Auto Differential    Specimen: Arm, Left; Blood   Result Value Ref Range    WBC 7.84 3.40 - 10.80 10*3/mm3    RBC 4.92 4.14 - 5.80 10*6/mm3    Hemoglobin 14.2 13.0 - 17.7 g/dL    Hematocrit 43.9 37.5 - 51.0 %    MCV 89.2 79.0 - 97.0 fL    MCH 28.9 26.6 - 33.0 pg    MCHC 32.3 31.5 - 35.7 g/dL    RDW 18.8 (H) 12.3 - 15.4 %    RDW-SD 61.3 (H) 37.0 - 54.0 fl    MPV 10.2 6.0 - 12.0 fL    Platelets 184 140 - 450 10*3/mm3    Neutrophil % 50.8 42.7 - 76.0 %    Lymphocyte % 39.2 19.6 - 45.3 %    Monocyte % 6.8 5.0 - 12.0 %    Eosinophil % 2.0 0.3 - 6.2 %    Basophil % 0.8 0.0 - 1.5 %    Immature Grans % 0.4 0.0 - 0.5 %    Neutrophils, Absolute 3.99 1.70 - 7.00 10*3/mm3    Lymphocytes, Absolute 3.07 0.70 - 3.10 10*3/mm3    Monocytes, Absolute 0.53 0.10 - 0.90 10*3/mm3    Eosinophils, Absolute 0.16 0.00 - 0.40 10*3/mm3    Basophils, Absolute 0.06 0.00 - 0.20 10*3/mm3    Immature Grans, Absolute 0.03 0.00 - 0.05 10*3/mm3    nRBC 0.0 0.0 - 0.2 /100 WBC   Iron and TIBC    Specimen: Arm, Left; Blood   Result Value Ref Range    Iron 49 (L) 59 - 158 mcg/dL    Iron Saturation 12 (L) 20 - 50 %    Transferrin 264 200 - 360 mg/dL    TIBC 393 298 - 536 mcg/dL   Vitamin D,25-Hydroxy    Specimen: Arm, Left; Blood   Result Value Ref Range    25 Hydroxy, Vitamin D  33.7 30.0 - 100.0 ng/ml   PTH, Intact    Specimen: Arm, Left; Blood   Result Value Ref Range    PTH, Intact 102.2 (H) 15.0 - 65.0 pg/mL   Folate    Specimen: Arm, Left; Blood   Result Value Ref Range    Folate 15.60 4.78 - 24.20 ng/mL   Ferritin    Specimen: Arm, Left; Blood   Result Value Ref Range    Ferritin 138.90 30.00 - 400.00 ng/mL   Vitamin B12    Specimen: Arm, Left; Blood   Result Value Ref Range    Vitamin B-12 1,170 (H) 211 - 946 pg/mL   Results for orders placed or performed in visit on 04/26/23   BUN    Specimen: Blood   Result Value Ref Range    BUN 18 8 - 23 mg/dL   Creatinine Serum (kidney function) GFR component    Specimen: Blood   Result Value Ref Range    Creatinine 1.03 0.76 - 1.27 mg/dL    eGFR 82.6 >60.0 mL/min/1.73   Results for orders placed or performed in visit on 03/15/23   CBC Auto Differential    Specimen: Arm, Left; Blood   Result Value Ref Range    WBC 7.82 3.40 - 10.80 10*3/mm3    RBC 4.76 4.14 - 5.80 10*6/mm3    Hemoglobin 11.6 (L) 13.0 - 17.7 g/dL    Hematocrit 38.5 37.5 - 51.0 %    MCV 80.9 79.0 - 97.0 fL    MCH 24.4 (L) 26.6 - 33.0 pg    MCHC 30.1 (L) 31.5 - 35.7 g/dL    RDW 22.9 (H) 12.3 - 15.4 %    RDW-SD 67.0 (H) 37.0 - 54.0 fl    MPV 9.9 6.0 - 12.0 fL    Platelets 172 140 - 450 10*3/mm3    Neutrophil % 51.0 42.7 - 76.0 %    Lymphocyte % 37.0 19.6 - 45.3 %    Monocyte % 7.4 5.0 - 12.0 %    Eosinophil % 3.1 0.3 - 6.2 %    Basophil % 1.2 0.0 - 1.5 %    Immature Grans % 0.3 0.0 - 0.5 %    Neutrophils, Absolute 4.00 1.70 - 7.00 10*3/mm3    Lymphocytes, Absolute 2.89 0.70 - 3.10 10*3/mm3    Monocytes, Absolute 0.58 0.10 - 0.90 10*3/mm3    Eosinophils, Absolute 0.24 0.00 - 0.40 10*3/mm3    Basophils, Absolute 0.09 0.00 - 0.20 10*3/mm3    Immature Grans, Absolute 0.02 0.00 - 0.05 10*3/mm3    nRBC 0.0 0.0 - 0.2 /100 WBC   Iron Profile    Specimen: Blood   Result Value Ref Range    Iron 31 (L) 59 - 158 mcg/dL    Iron Saturation 6 (L) 20 - 50 %    Transferrin 323 200 - 360 mg/dL     TIBC 481 298 - 536 mcg/dL   Vitamin D,25-Hydroxy    Specimen: Arm, Left; Blood   Result Value Ref Range    25 Hydroxy, Vitamin D 27.9 (L) 30.0 - 100.0 ng/ml   PTH, Intact    Specimen: Blood   Result Value Ref Range    PTH, Intact 108.6 (H) 15.0 - 65.0 pg/mL   Folate    Specimen: Blood   Result Value Ref Range    Folate 13.10 4.78 - 24.20 ng/mL   Ferritin    Specimen: Blood   Result Value Ref Range    Ferritin 10.84 (L) 30.00 - 400.00 ng/mL   Vitamin B12    Specimen: Blood   Result Value Ref Range    Vitamin B-12 795 211 - 946 pg/mL   Results for orders placed or performed during the hospital encounter of 23   TISSUE EXAM, [facility] ([facility])    Specimen: A: Small Intestine, Duodenum; Tissue    B: Gastric, Body; Tissue    C: Esophagus; Tissue    D: Large Intestine, Cecum; Polyp   Result Value Ref Range    Reference Lab Report       [facility]  [address] [place] [address]  Phone: [phone] or [phone]  Fax: [phone]  Jamie Mcdonald M.D., Medical Director    PATIENT NAME                           LABORATORY NO.  [id number]  GEORGE DIXON.                [id number]  [id number]                         AGE              SEX  SSN           CLIENT REF #  [facility]           62      1960      [id number]   [id number]    [place]                       REQUESTING M.D.     ATTENDING M.D.     COPY TO[address]                 SYDNEE KEYS MICHAEL  [address]  DATE COLLECTED      DATE RECEIVED      DATE REPORTED  2023    DIAGNOSIS:  A.   SMALL BOWEL, BIOPSY:  Small intestinal mucosa with no significant histopathologic abnormalities  B.   GASTRIC BIOPSY:  Gastric antral type mucosa with reactive (chemical) gastropathy  Negative for intestinal  metaplasia or dysplasia  No Helicobacter pylori-like organisms seen  C.   GE JUNCTION:  Squamous  "mucosa with features suggestive of reflux esophagitis (no  eosinophils seen)  Glandular mucosa with mild chronic inflammation  Negative for intestinal metaplasia or dysplasia  Negative for specific microorganisms  D.   CECUM POLYP:  Adenomatous polyp (tubular adenoma)  Negative for high-grade dysplasia    SUSIE    CLINICAL HISTORY:  History of gastric bypass, other iron deficiency anemia    SPECIMENS RECEIVED:  A.  SMALL BOWEL, BIOPSY  B.  GASTRIC BIOPSY  C.  GE JUNCTION  D.  CECUM POLYP    MICROSCOPIC DESCRIPTION:  Tissue blocks are prepared and slides are examined microscopically on all  specimens. See diagnosis for details.    Professional interpretation rendered by Silvano Jay M.D., MAXWELLC.A.P. at [facility], [address] [place] [address].    GROSS DESCRIPTION:  A.  The specimen is received in 1 formalin filled container labeled \"small  bowel biopsy\" and consists of multiple  pieces of tan soft tissue measuring  0.5 x 0.3 x 0.2 cm in aggregate.  The specimen is submitted entirely in 1  cassette.  KAMI  B.  Specimen is received in 1 formalin filled container labeled \"gastric biopsy\"  and consists of 3 pieces of tan soft tissue measuring 0.4 x 0.3 x 0.2 cm in  aggregate.  The specimen is submitted entirely in 1 cassette.  C.  Specimen is received in 1 formalin filled container labeled \"EG junction  biopsy\" and consists of multiple pieces of tan soft tissue measuring 0.3 x  0.3 x 0.2 cm in aggregate.  The specimen is submitted entirely in 1  cassette.  D.  Specimen is received in 1 formalin filled container labeled \"cecum polyp\"  and consists of 1 piece of tan soft tissue measuring 0.3 x 0.2 x 0.2 cm.  Specimen is submitted entirely in 1 cassette.    REVIEWED, DIAGNOSED AND ELECTRONICALLY  SIGNED BY:    Silvano Jay M.D., F.C.A.P.  CPT CODES:  88305x4     Results for orders placed or performed in visit on 01/11/23   CBC Auto Differential    Specimen: Blood   Result Value Ref Range    WBC 8.87 3.40 - " 10.80 10*3/mm3    RBC 4.11 (L) 4.14 - 5.80 10*6/mm3    Hemoglobin 7.5 (L) 13.0 - 17.7 g/dL    Hematocrit 28.5 (L) 37.5 - 51.0 %    MCV 69.3 (L) 79.0 - 97.0 fL    MCH 18.2 (L) 26.6 - 33.0 pg    MCHC 26.3 (L) 31.5 - 35.7 g/dL    RDW 22.9 (H) 12.3 - 15.4 %    RDW-SD 55.8 (H) 37.0 - 54.0 fl    MPV 9.8 6.0 - 12.0 fL    Platelets 279 140 - 450 10*3/mm3    Neutrophil % 58.0 42.7 - 76.0 %    Lymphocyte % 31.8 19.6 - 45.3 %    Monocyte % 6.4 5.0 - 12.0 %    Eosinophil % 1.8 0.3 - 6.2 %    Basophil % 1.5 0.0 - 1.5 %    Immature Grans % 0.5 0.0 - 0.5 %    Neutrophils, Absolute 5.15 1.70 - 7.00 10*3/mm3    Lymphocytes, Absolute 2.82 0.70 - 3.10 10*3/mm3    Monocytes, Absolute 0.57 0.10 - 0.90 10*3/mm3    Eosinophils, Absolute 0.16 0.00 - 0.40 10*3/mm3    Basophils, Absolute 0.13 0.00 - 0.20 10*3/mm3    Immature Grans, Absolute 0.04 0.00 - 0.05 10*3/mm3    nRBC 0.0 0.0 - 0.2 /100 WBC   Iron and TIBC    Specimen: Blood   Result Value Ref Range    Iron 13 (L) 59 - 158 mcg/dL    Iron Saturation 2 (L) 20 - 50 %    Transferrin 361 (H) 200 - 360 mg/dL    TIBC 538 (H) 298 - 536 mcg/dL   Ferritin    Specimen: Blood   Result Value Ref Range    Ferritin 5.56 (L) 30.00 - 400.00 ng/mL   Results for orders placed or performed in visit on 01/10/23   POCT FECAL OCCULT BLOOD BY IMMUNOASSAY    Specimen: Stool   Result Value Ref Range    POC OCCULT BLOOD BY IMMUNOASSAY Negative Negative     *Note: Due to a large number of results and/or encounters for the requested time period, some results have not been displayed. A complete set of results can be found in Results Review.         This document has been electronically signed by Carlton Olson MD on May 27, 2023 08:21 CDT

## 2023-05-30 ENCOUNTER — TELEPHONE (OUTPATIENT)
Dept: ONCOLOGY | Facility: CLINIC | Age: 63
End: 2023-05-30

## 2023-05-31 ENCOUNTER — TELEPHONE (OUTPATIENT)
Dept: FAMILY MEDICINE CLINIC | Facility: CLINIC | Age: 63
End: 2023-05-31

## 2023-05-31 RX ORDER — NEBIVOLOL 10 MG/1
10 TABLET ORAL DAILY
Qty: 30 TABLET | Refills: 0 | Status: SHIPPED | OUTPATIENT
Start: 2023-05-31

## 2023-05-31 NOTE — TELEPHONE ENCOUNTER
INFORMED PATIENT OF LAB RESULTS AND THAT YOU PUT IN A REFERRAL TO ENDOCRINOLOGY.      PATIENT STATES HIS BP HAS BEEN RUNNING BETWEEN 140/80 /LOW 80s.  HE NEEDS A REFILL ON HIS BP MEDS.  HE DIDN'T KNOW IF YOU NEEDED HIM TO COME IN AGAIN OR WERE GOING TO MAKE ANY CHANGES TO MEDS.  [facility]

## 2023-06-01 RX ORDER — OLMESARTAN MEDOXOMIL 40 MG/1
TABLET ORAL
Qty: 90 TABLET | Refills: 3 | Status: SHIPPED | OUTPATIENT
Start: 2023-06-01

## 2023-06-02 ENCOUNTER — INFUSION (OUTPATIENT)
Dept: ONCOLOGY | Facility: HOSPITAL | Age: 63
End: 2023-06-02

## 2023-06-02 VITALS
TEMPERATURE: 96.7 F | RESPIRATION RATE: 18 BRPM | SYSTOLIC BLOOD PRESSURE: 174 MMHG | DIASTOLIC BLOOD PRESSURE: 83 MMHG | HEART RATE: 47 BPM

## 2023-06-02 DIAGNOSIS — D50.0 IRON DEFICIENCY ANEMIA DUE TO CHRONIC BLOOD LOSS: ICD-10-CM

## 2023-06-02 DIAGNOSIS — K90.9 IRON MALABSORPTION: Primary | ICD-10-CM

## 2023-06-02 DIAGNOSIS — Z98.84 HISTORY OF GASTRIC BYPASS: ICD-10-CM

## 2023-06-02 DIAGNOSIS — E21.3 HYPERPARATHYROIDISM: Primary | ICD-10-CM

## 2023-06-02 PROCEDURE — 25010000002 IRON SUCROSE PER 1 MG: Performed by: INTERNAL MEDICINE

## 2023-06-02 PROCEDURE — 63710000001 DIPHENHYDRAMINE PER 50 MG: Performed by: INTERNAL MEDICINE

## 2023-06-02 RX ORDER — SODIUM CHLORIDE 9 MG/ML
250 INJECTION, SOLUTION INTRAVENOUS ONCE
Status: COMPLETED | OUTPATIENT
Start: 2023-06-02 | End: 2023-06-02

## 2023-06-02 RX ORDER — FAMOTIDINE 10 MG/ML
20 INJECTION, SOLUTION INTRAVENOUS AS NEEDED
Status: DISCONTINUED | OUTPATIENT
Start: 2023-06-02 | End: 2023-06-02 | Stop reason: HOSPADM

## 2023-06-02 RX ORDER — ACETAMINOPHEN 325 MG/1
650 TABLET ORAL ONCE
OUTPATIENT
Start: 2023-06-04

## 2023-06-02 RX ORDER — DIPHENHYDRAMINE HYDROCHLORIDE 50 MG/ML
50 INJECTION INTRAMUSCULAR; INTRAVENOUS AS NEEDED
Status: DISCONTINUED | OUTPATIENT
Start: 2023-06-02 | End: 2023-06-02 | Stop reason: HOSPADM

## 2023-06-02 RX ORDER — FAMOTIDINE 20 MG/1
20 TABLET, FILM COATED ORAL ONCE
OUTPATIENT
Start: 2023-06-04

## 2023-06-02 RX ORDER — DIPHENHYDRAMINE HCL 25 MG
25 CAPSULE ORAL ONCE
Status: COMPLETED | OUTPATIENT
Start: 2023-06-02 | End: 2023-06-02

## 2023-06-02 RX ORDER — DIPHENHYDRAMINE HYDROCHLORIDE 50 MG/ML
50 INJECTION INTRAMUSCULAR; INTRAVENOUS AS NEEDED
OUTPATIENT
Start: 2023-06-04

## 2023-06-02 RX ORDER — FAMOTIDINE 20 MG/1
20 TABLET, FILM COATED ORAL ONCE
Status: COMPLETED | OUTPATIENT
Start: 2023-06-02 | End: 2023-06-02

## 2023-06-02 RX ORDER — FAMOTIDINE 10 MG/ML
20 INJECTION, SOLUTION INTRAVENOUS AS NEEDED
OUTPATIENT
Start: 2023-06-04

## 2023-06-02 RX ORDER — SODIUM CHLORIDE 9 MG/ML
250 INJECTION, SOLUTION INTRAVENOUS ONCE
OUTPATIENT
Start: 2023-06-04

## 2023-06-02 RX ORDER — ACETAMINOPHEN 325 MG/1
650 TABLET ORAL ONCE
Status: COMPLETED | OUTPATIENT
Start: 2023-06-02 | End: 2023-06-02

## 2023-06-02 RX ORDER — DIPHENHYDRAMINE HCL 25 MG
25 CAPSULE ORAL ONCE
OUTPATIENT
Start: 2023-06-04

## 2023-06-02 RX ADMIN — FAMOTIDINE 20 MG: 20 TABLET ORAL at 08:47

## 2023-06-02 RX ADMIN — DIPHENHYDRAMINE HYDROCHLORIDE 25 MG: 25 CAPSULE ORAL at 08:47

## 2023-06-02 RX ADMIN — IRON SUCROSE 200 MG: 20 INJECTION, SOLUTION INTRAVENOUS at 09:17

## 2023-06-02 RX ADMIN — SODIUM CHLORIDE 250 ML: 9 INJECTION, SOLUTION INTRAVENOUS at 08:58

## 2023-06-02 RX ADMIN — ACETAMINOPHEN 650 MG: 325 TABLET, FILM COATED ORAL at 08:47

## 2023-06-06 ENCOUNTER — INFUSION (OUTPATIENT)
Dept: ONCOLOGY | Facility: HOSPITAL | Age: 63
End: 2023-06-06
Payer: COMMERCIAL

## 2023-06-06 VITALS
RESPIRATION RATE: 18 BRPM | TEMPERATURE: 97.7 F | HEART RATE: 51 BPM | SYSTOLIC BLOOD PRESSURE: 187 MMHG | OXYGEN SATURATION: 100 % | DIASTOLIC BLOOD PRESSURE: 85 MMHG

## 2023-06-06 DIAGNOSIS — D50.0 IRON DEFICIENCY ANEMIA DUE TO CHRONIC BLOOD LOSS: Primary | ICD-10-CM

## 2023-06-06 DIAGNOSIS — Z98.84 HISTORY OF GASTRIC BYPASS: ICD-10-CM

## 2023-06-06 DIAGNOSIS — K90.9 IRON MALABSORPTION: ICD-10-CM

## 2023-06-06 LAB
ALBUMIN SERPL-MCNC: 4 G/DL (ref 3.5–5.2)
ALBUMIN/GLOB SERPL: 1.8 G/DL
ALP SERPL-CCNC: 116 U/L (ref 39–117)
ALT SERPL W P-5'-P-CCNC: 28 U/L (ref 1–41)
ANION GAP SERPL CALCULATED.3IONS-SCNC: 8 MMOL/L (ref 5–15)
AST SERPL-CCNC: 27 U/L (ref 1–40)
BILIRUB SERPL-MCNC: 0.6 MG/DL (ref 0–1.2)
BUN SERPL-MCNC: 23 MG/DL (ref 8–23)
BUN/CREAT SERPL: 24 (ref 7–25)
CALCIUM SPEC-SCNC: 8.2 MG/DL (ref 8.6–10.5)
CHLORIDE SERPL-SCNC: 107 MMOL/L (ref 98–107)
CO2 SERPL-SCNC: 25 MMOL/L (ref 22–29)
CREAT SERPL-MCNC: 0.96 MG/DL (ref 0.76–1.27)
EGFRCR SERPLBLD CKD-EPI 2021: 89.9 ML/MIN/1.73
GLOBULIN UR ELPH-MCNC: 2.2 GM/DL
GLUCOSE SERPL-MCNC: 95 MG/DL (ref 65–99)
POTASSIUM SERPL-SCNC: 4.9 MMOL/L (ref 3.5–5.2)
PROT SERPL-MCNC: 6.2 G/DL (ref 6–8.5)
SODIUM SERPL-SCNC: 140 MMOL/L (ref 136–145)

## 2023-06-06 PROCEDURE — 80053 COMPREHEN METABOLIC PANEL: CPT | Performed by: FAMILY MEDICINE

## 2023-06-06 PROCEDURE — 63710000001 DIPHENHYDRAMINE PER 50 MG: Performed by: INTERNAL MEDICINE

## 2023-06-06 PROCEDURE — 25010000002 IRON SUCROSE PER 1 MG: Performed by: INTERNAL MEDICINE

## 2023-06-06 RX ORDER — DIPHENHYDRAMINE HYDROCHLORIDE 50 MG/ML
50 INJECTION INTRAMUSCULAR; INTRAVENOUS AS NEEDED
Status: DISCONTINUED | OUTPATIENT
Start: 2023-06-06 | End: 2023-06-06 | Stop reason: HOSPADM

## 2023-06-06 RX ORDER — ACETAMINOPHEN 325 MG/1
650 TABLET ORAL ONCE
Status: COMPLETED | OUTPATIENT
Start: 2023-06-06 | End: 2023-06-06

## 2023-06-06 RX ORDER — DIPHENHYDRAMINE HYDROCHLORIDE 50 MG/ML
50 INJECTION INTRAMUSCULAR; INTRAVENOUS AS NEEDED
Status: CANCELLED | OUTPATIENT
Start: 2023-06-08

## 2023-06-06 RX ORDER — FAMOTIDINE 20 MG/1
20 TABLET, FILM COATED ORAL ONCE
Status: CANCELLED | OUTPATIENT
Start: 2023-06-08

## 2023-06-06 RX ORDER — ACETAMINOPHEN 325 MG/1
650 TABLET ORAL ONCE
Status: CANCELLED | OUTPATIENT
Start: 2023-06-08

## 2023-06-06 RX ORDER — DIPHENHYDRAMINE HCL 25 MG
25 CAPSULE ORAL ONCE
Status: COMPLETED | OUTPATIENT
Start: 2023-06-06 | End: 2023-06-06

## 2023-06-06 RX ORDER — FAMOTIDINE 10 MG/ML
20 INJECTION, SOLUTION INTRAVENOUS AS NEEDED
Status: CANCELLED | OUTPATIENT
Start: 2023-06-08

## 2023-06-06 RX ORDER — SODIUM CHLORIDE 9 MG/ML
250 INJECTION, SOLUTION INTRAVENOUS ONCE
Status: CANCELLED | OUTPATIENT
Start: 2023-06-08

## 2023-06-06 RX ORDER — FAMOTIDINE 20 MG/1
20 TABLET, FILM COATED ORAL ONCE
Status: COMPLETED | OUTPATIENT
Start: 2023-06-06 | End: 2023-06-06

## 2023-06-06 RX ORDER — SODIUM CHLORIDE 9 MG/ML
250 INJECTION, SOLUTION INTRAVENOUS ONCE
Status: COMPLETED | OUTPATIENT
Start: 2023-06-06 | End: 2023-06-06

## 2023-06-06 RX ORDER — DIPHENHYDRAMINE HCL 25 MG
25 CAPSULE ORAL ONCE
Status: CANCELLED | OUTPATIENT
Start: 2023-06-08

## 2023-06-06 RX ORDER — FAMOTIDINE 10 MG/ML
20 INJECTION, SOLUTION INTRAVENOUS AS NEEDED
Status: DISCONTINUED | OUTPATIENT
Start: 2023-06-06 | End: 2023-06-06 | Stop reason: HOSPADM

## 2023-06-06 RX ADMIN — DIPHENHYDRAMINE HYDROCHLORIDE 25 MG: 25 CAPSULE ORAL at 15:13

## 2023-06-06 RX ADMIN — IRON SUCROSE 200 MG: 20 INJECTION, SOLUTION INTRAVENOUS at 15:42

## 2023-06-06 RX ADMIN — SODIUM CHLORIDE 250 ML: 9 INJECTION, SOLUTION INTRAVENOUS at 15:13

## 2023-06-06 RX ADMIN — FAMOTIDINE 20 MG: 20 TABLET ORAL at 15:13

## 2023-06-06 RX ADMIN — ACETAMINOPHEN 650 MG: 325 TABLET, FILM COATED ORAL at 15:13

## 2023-06-08 ENCOUNTER — INFUSION (OUTPATIENT)
Dept: ONCOLOGY | Facility: HOSPITAL | Age: 63
End: 2023-06-08
Payer: COMMERCIAL

## 2023-06-08 VITALS
SYSTOLIC BLOOD PRESSURE: 179 MMHG | RESPIRATION RATE: 18 BRPM | TEMPERATURE: 96.8 F | HEART RATE: 49 BPM | DIASTOLIC BLOOD PRESSURE: 81 MMHG

## 2023-06-08 DIAGNOSIS — K90.9 IRON MALABSORPTION: ICD-10-CM

## 2023-06-08 DIAGNOSIS — Z98.84 HISTORY OF GASTRIC BYPASS: ICD-10-CM

## 2023-06-08 DIAGNOSIS — D50.0 IRON DEFICIENCY ANEMIA DUE TO CHRONIC BLOOD LOSS: Primary | ICD-10-CM

## 2023-06-08 PROCEDURE — 25010000002 IRON SUCROSE PER 1 MG: Performed by: INTERNAL MEDICINE

## 2023-06-08 RX ORDER — DIPHENHYDRAMINE HYDROCHLORIDE 50 MG/ML
50 INJECTION INTRAMUSCULAR; INTRAVENOUS AS NEEDED
Status: DISCONTINUED | OUTPATIENT
Start: 2023-06-08 | End: 2023-06-08 | Stop reason: HOSPADM

## 2023-06-08 RX ORDER — SODIUM CHLORIDE 9 MG/ML
250 INJECTION, SOLUTION INTRAVENOUS ONCE
Status: CANCELLED | OUTPATIENT
Start: 2023-06-10

## 2023-06-08 RX ORDER — DIPHENHYDRAMINE HYDROCHLORIDE 50 MG/ML
50 INJECTION INTRAMUSCULAR; INTRAVENOUS AS NEEDED
Status: CANCELLED | OUTPATIENT
Start: 2023-06-10

## 2023-06-08 RX ORDER — DIPHENHYDRAMINE HCL 25 MG
25 CAPSULE ORAL ONCE
Status: DISCONTINUED | OUTPATIENT
Start: 2023-06-08 | End: 2023-06-08 | Stop reason: HOSPADM

## 2023-06-08 RX ORDER — FAMOTIDINE 20 MG/1
20 TABLET, FILM COATED ORAL ONCE
Status: DISCONTINUED | OUTPATIENT
Start: 2023-06-08 | End: 2023-06-08 | Stop reason: HOSPADM

## 2023-06-08 RX ORDER — ACETAMINOPHEN 325 MG/1
650 TABLET ORAL ONCE
Status: CANCELLED | OUTPATIENT
Start: 2023-06-10

## 2023-06-08 RX ORDER — FAMOTIDINE 20 MG/1
20 TABLET, FILM COATED ORAL ONCE
Status: CANCELLED | OUTPATIENT
Start: 2023-06-10

## 2023-06-08 RX ORDER — FAMOTIDINE 10 MG/ML
20 INJECTION, SOLUTION INTRAVENOUS AS NEEDED
Status: DISCONTINUED | OUTPATIENT
Start: 2023-06-08 | End: 2023-06-08 | Stop reason: HOSPADM

## 2023-06-08 RX ORDER — DIPHENHYDRAMINE HCL 25 MG
25 CAPSULE ORAL ONCE
Status: CANCELLED | OUTPATIENT
Start: 2023-06-10

## 2023-06-08 RX ORDER — FAMOTIDINE 10 MG/ML
20 INJECTION, SOLUTION INTRAVENOUS AS NEEDED
Status: CANCELLED | OUTPATIENT
Start: 2023-06-10

## 2023-06-08 RX ORDER — ACETAMINOPHEN 325 MG/1
650 TABLET ORAL ONCE
Status: DISCONTINUED | OUTPATIENT
Start: 2023-06-08 | End: 2023-06-08 | Stop reason: HOSPADM

## 2023-06-08 RX ORDER — SODIUM CHLORIDE 9 MG/ML
250 INJECTION, SOLUTION INTRAVENOUS ONCE
Status: COMPLETED | OUTPATIENT
Start: 2023-06-08 | End: 2023-06-08

## 2023-06-08 RX ADMIN — SODIUM CHLORIDE 250 ML: 9 INJECTION, SOLUTION INTRAVENOUS at 08:17

## 2023-06-08 RX ADMIN — IRON SUCROSE 200 MG: 20 INJECTION, SOLUTION INTRAVENOUS at 08:18

## 2023-06-12 ENCOUNTER — INFUSION (OUTPATIENT)
Dept: ONCOLOGY | Facility: HOSPITAL | Age: 63
End: 2023-06-12
Payer: COMMERCIAL

## 2023-06-12 VITALS — HEART RATE: 63 BPM | TEMPERATURE: 97.6 F | SYSTOLIC BLOOD PRESSURE: 186 MMHG | DIASTOLIC BLOOD PRESSURE: 86 MMHG

## 2023-06-12 DIAGNOSIS — K90.9 IRON MALABSORPTION: Primary | ICD-10-CM

## 2023-06-12 DIAGNOSIS — D50.0 IRON DEFICIENCY ANEMIA DUE TO CHRONIC BLOOD LOSS: ICD-10-CM

## 2023-06-12 DIAGNOSIS — Z98.84 HISTORY OF GASTRIC BYPASS: ICD-10-CM

## 2023-06-12 PROCEDURE — 25010000002 IRON SUCROSE PER 1 MG: Performed by: INTERNAL MEDICINE

## 2023-06-12 RX ORDER — SODIUM CHLORIDE 9 MG/ML
250 INJECTION, SOLUTION INTRAVENOUS ONCE
Status: COMPLETED | OUTPATIENT
Start: 2023-06-12 | End: 2023-06-12

## 2023-06-12 RX ORDER — FAMOTIDINE 10 MG/ML
20 INJECTION, SOLUTION INTRAVENOUS AS NEEDED
Status: CANCELLED | OUTPATIENT
Start: 2023-06-14

## 2023-06-12 RX ORDER — DIPHENHYDRAMINE HCL 25 MG
25 CAPSULE ORAL ONCE
Status: CANCELLED | OUTPATIENT
Start: 2023-06-14

## 2023-06-12 RX ORDER — FAMOTIDINE 10 MG/ML
20 INJECTION, SOLUTION INTRAVENOUS AS NEEDED
Status: DISCONTINUED | OUTPATIENT
Start: 2023-06-12 | End: 2023-06-12 | Stop reason: HOSPADM

## 2023-06-12 RX ORDER — DIPHENHYDRAMINE HYDROCHLORIDE 50 MG/ML
50 INJECTION INTRAMUSCULAR; INTRAVENOUS AS NEEDED
Status: DISCONTINUED | OUTPATIENT
Start: 2023-06-12 | End: 2023-06-12 | Stop reason: HOSPADM

## 2023-06-12 RX ORDER — SODIUM CHLORIDE 9 MG/ML
250 INJECTION, SOLUTION INTRAVENOUS ONCE
Status: CANCELLED | OUTPATIENT
Start: 2023-06-14

## 2023-06-12 RX ORDER — DIPHENHYDRAMINE HYDROCHLORIDE 50 MG/ML
50 INJECTION INTRAMUSCULAR; INTRAVENOUS AS NEEDED
Status: CANCELLED | OUTPATIENT
Start: 2023-06-14

## 2023-06-12 RX ORDER — FAMOTIDINE 20 MG/1
20 TABLET, FILM COATED ORAL ONCE
Status: CANCELLED | OUTPATIENT
Start: 2023-06-14

## 2023-06-12 RX ORDER — DIPHENHYDRAMINE HCL 25 MG
25 CAPSULE ORAL ONCE
Status: DISCONTINUED | OUTPATIENT
Start: 2023-06-12 | End: 2023-06-12 | Stop reason: HOSPADM

## 2023-06-12 RX ORDER — ACETAMINOPHEN 325 MG/1
650 TABLET ORAL ONCE
Status: CANCELLED | OUTPATIENT
Start: 2023-06-14

## 2023-06-12 RX ORDER — ACETAMINOPHEN 325 MG/1
650 TABLET ORAL ONCE
Status: DISCONTINUED | OUTPATIENT
Start: 2023-06-12 | End: 2023-06-12 | Stop reason: HOSPADM

## 2023-06-12 RX ORDER — FAMOTIDINE 20 MG/1
20 TABLET, FILM COATED ORAL ONCE
Status: DISCONTINUED | OUTPATIENT
Start: 2023-06-12 | End: 2023-06-12 | Stop reason: HOSPADM

## 2023-06-12 RX ADMIN — IRON SUCROSE 200 MG: 20 INJECTION, SOLUTION INTRAVENOUS at 13:21

## 2023-06-12 RX ADMIN — SODIUM CHLORIDE 250 ML: 9 INJECTION, SOLUTION INTRAVENOUS at 13:21

## 2023-06-15 ENCOUNTER — INFUSION (OUTPATIENT)
Dept: ONCOLOGY | Facility: HOSPITAL | Age: 63
End: 2023-06-15
Payer: COMMERCIAL

## 2023-06-15 VITALS
RESPIRATION RATE: 18 BRPM | TEMPERATURE: 97.3 F | SYSTOLIC BLOOD PRESSURE: 185 MMHG | HEART RATE: 48 BPM | DIASTOLIC BLOOD PRESSURE: 83 MMHG

## 2023-06-15 DIAGNOSIS — Z98.84 HISTORY OF GASTRIC BYPASS: ICD-10-CM

## 2023-06-15 DIAGNOSIS — K90.9 IRON MALABSORPTION: ICD-10-CM

## 2023-06-15 DIAGNOSIS — D50.0 IRON DEFICIENCY ANEMIA DUE TO CHRONIC BLOOD LOSS: Primary | ICD-10-CM

## 2023-06-15 PROCEDURE — 25010000002 IRON SUCROSE PER 1 MG: Performed by: INTERNAL MEDICINE

## 2023-06-15 RX ORDER — FAMOTIDINE 10 MG/ML
20 INJECTION, SOLUTION INTRAVENOUS AS NEEDED
Status: CANCELLED | OUTPATIENT
Start: 2023-06-16

## 2023-06-15 RX ORDER — DIPHENHYDRAMINE HYDROCHLORIDE 50 MG/ML
50 INJECTION INTRAMUSCULAR; INTRAVENOUS AS NEEDED
Status: CANCELLED | OUTPATIENT
Start: 2023-06-16

## 2023-06-15 RX ORDER — DIPHENHYDRAMINE HCL 25 MG
25 CAPSULE ORAL ONCE
Status: DISCONTINUED | OUTPATIENT
Start: 2023-06-15 | End: 2023-06-15 | Stop reason: HOSPADM

## 2023-06-15 RX ORDER — FAMOTIDINE 20 MG/1
20 TABLET, FILM COATED ORAL ONCE
Status: DISCONTINUED | OUTPATIENT
Start: 2023-06-15 | End: 2023-06-15 | Stop reason: HOSPADM

## 2023-06-15 RX ORDER — FAMOTIDINE 20 MG/1
20 TABLET, FILM COATED ORAL ONCE
Status: CANCELLED | OUTPATIENT
Start: 2023-06-16

## 2023-06-15 RX ORDER — DIPHENHYDRAMINE HCL 25 MG
25 CAPSULE ORAL ONCE
Status: CANCELLED | OUTPATIENT
Start: 2023-06-16

## 2023-06-15 RX ORDER — DIPHENHYDRAMINE HYDROCHLORIDE 50 MG/ML
50 INJECTION INTRAMUSCULAR; INTRAVENOUS AS NEEDED
Status: DISCONTINUED | OUTPATIENT
Start: 2023-06-15 | End: 2023-06-15 | Stop reason: HOSPADM

## 2023-06-15 RX ORDER — ACETAMINOPHEN 325 MG/1
650 TABLET ORAL ONCE
Status: CANCELLED | OUTPATIENT
Start: 2023-06-16

## 2023-06-15 RX ORDER — SODIUM CHLORIDE 9 MG/ML
250 INJECTION, SOLUTION INTRAVENOUS ONCE
Status: CANCELLED | OUTPATIENT
Start: 2023-06-16

## 2023-06-15 RX ORDER — ACETAMINOPHEN 325 MG/1
650 TABLET ORAL ONCE
Status: DISCONTINUED | OUTPATIENT
Start: 2023-06-15 | End: 2023-06-15 | Stop reason: HOSPADM

## 2023-06-15 RX ORDER — SODIUM CHLORIDE 9 MG/ML
250 INJECTION, SOLUTION INTRAVENOUS ONCE
Status: COMPLETED | OUTPATIENT
Start: 2023-06-15 | End: 2023-06-15

## 2023-06-15 RX ORDER — AMOXICILLIN 500 MG/1
500 CAPSULE ORAL 3 TIMES DAILY
COMMUNITY
Start: 2023-06-06

## 2023-06-15 RX ORDER — FAMOTIDINE 10 MG/ML
20 INJECTION, SOLUTION INTRAVENOUS AS NEEDED
Status: DISCONTINUED | OUTPATIENT
Start: 2023-06-15 | End: 2023-06-15 | Stop reason: HOSPADM

## 2023-06-15 RX ORDER — ACETAMINOPHEN AND CODEINE PHOSPHATE 300; 30 MG/1; MG/1
1 TABLET ORAL EVERY 4 HOURS PRN
COMMUNITY
Start: 2023-06-14

## 2023-06-15 RX ADMIN — IRON SUCROSE 200 MG: 20 INJECTION, SOLUTION INTRAVENOUS at 07:43

## 2023-06-15 RX ADMIN — SODIUM CHLORIDE 250 ML: 9 INJECTION, SOLUTION INTRAVENOUS at 07:42

## 2023-08-02 ENCOUNTER — OFFICE VISIT (OUTPATIENT)
Dept: GASTROENTEROLOGY | Facility: CLINIC | Age: 63
End: 2023-08-02
Payer: COMMERCIAL

## 2023-08-02 VITALS
WEIGHT: 242 LBS | SYSTOLIC BLOOD PRESSURE: 149 MMHG | BODY MASS INDEX: 34.65 KG/M2 | HEIGHT: 70 IN | DIASTOLIC BLOOD PRESSURE: 85 MMHG

## 2023-08-02 DIAGNOSIS — R79.89 ELEVATED LFTS: Primary | ICD-10-CM

## 2023-08-15 RX ORDER — NEBIVOLOL 10 MG/1
5-10 TABLET ORAL DAILY
Qty: 90 TABLET | Refills: 3 | Status: SHIPPED | OUTPATIENT
Start: 2023-08-15

## 2023-08-15 RX ORDER — AMLODIPINE BESYLATE 5 MG/1
5 TABLET ORAL DAILY
Qty: 90 TABLET | Refills: 3 | Status: SHIPPED | OUTPATIENT
Start: 2023-08-15 | End: 2023-08-15 | Stop reason: DRUGHIGH

## 2023-08-15 RX ORDER — NEBIVOLOL 10 MG/1
10 TABLET ORAL DAILY
Qty: 90 TABLET | Refills: 3 | Status: SHIPPED | OUTPATIENT
Start: 2023-08-15 | End: 2023-08-15 | Stop reason: SDUPTHER

## 2023-08-15 RX ORDER — AMLODIPINE BESYLATE 10 MG/1
5-10 TABLET ORAL DAILY
Qty: 30 TABLET | Refills: 0 | OUTPATIENT
Start: 2023-08-15

## 2023-08-15 RX ORDER — AMLODIPINE BESYLATE 10 MG/1
10 TABLET ORAL DAILY
Qty: 90 TABLET | Refills: 3 | Status: SHIPPED | OUTPATIENT
Start: 2023-08-15

## 2023-08-15 NOTE — TELEPHONE ENCOUNTER
Incoming Refill Request      Medication requested (name and dose): amlodipine 10  mg 1 qd,  Nebivolol 10 mg, patient is taking 5 mg daily    Pharmacy where request should be sent: [facility]    Additional details provided by patient:     Best call back number:     Does the patient have less than a 3 day supply:  [] Yes  [] No    Curt Jefferson Rep  08/15/23, 13:11 CDT

## 2023-08-15 NOTE — TELEPHONE ENCOUNTER
Mr. Almon called and said that the [facility] you told him to take 1/2 of 10mg tab for a week then to take the 10 mg tab.  On the Bystolic he is taking 1/2 of a 10 mg tab.

## 2023-08-19 NOTE — PROGRESS NOTES
Chief Complaint   Patient presents with    Anemia     3 month f/u        Subjective    Blas James is a 63 y.o. male.    History of Present Illness  Patient presented to GI clinic for follow-up visit today.  Feels better currently.  Denied abdominal pain, nausea or vomiting.  Bowel movements regular.  Weight is stable.  LFTs are normal.  Most recent hemoglobin was 15.6.       The following portions of the patient's history were reviewed and updated as appropriate:   Past Medical History:   Diagnosis Date    Hyperlipidemia     Seasonal allergies      Past Surgical History:   Procedure Laterality Date    COLONOSCOPY N/A 1/17/2023    Procedure: COLONOSCOPY;  Surgeon: Carlton Olson MD;  Location: [facility] ENDOSCOPY;  Service: Gastroenterology;  Laterality: N/A;    ENDOSCOPY N/A 1/17/2023    Procedure: ESOPHAGOGASTRODUODENOSCOPY;  Surgeon: Carlton Olson MD;  Location: [facility] ENDOSCOPY;  Service: Gastroenterology;  Laterality: N/A;    GASTRIC BYPASS       History reviewed. No pertinent family history.    Prior to Admission medications    Medication Sig Start Date End Date Taking? Authorizing Provider   Calcium Citrate (CITRACAL PO) Take 1,200 mg by mouth.   Yes Sanford Bedoya MD   ferrous sulfate 325 (65 FE) MG tablet Take 1 tablet by mouth Daily With Breakfast.   Yes Sanford Bedoya MD   folic acid (FOLVITE) 1 MG tablet Take 1 tablet by mouth Daily. 7/19/23  Yes Mariana Shelton APRN   LORATADINE ALLERGY RELIEF PO Take  by mouth.   Yes Sanford Bedoya MD   multivitamin with minerals tablet tablet Take 1 tablet by mouth Daily.   Yes Sanford Bedoya MD   olmesartan (BENICAR) 40 MG tablet Take 1 tablet by mouth Daily. 6/30/23  Yes Merry Eisenberg APRN   vitamin B-12 (CYANOCOBALAMIN) 1000 MCG tablet Take 1 tablet by mouth Daily. 7/19/23  Yes Mariana Shelton APRN   Vitamin D, Cholecalciferol, (CHOLECALCIFEROL) 10 MCG (400 UNIT) tablet Take 1 tablet by mouth Daily.   Yes Aureliano  "MD Sanford   amLODIPine (NORVASC) 10 MG tablet Take 1 tablet by mouth Daily. 8/15/23   Trell Brooks MD   nebivolol (Bystolic) 10 MG tablet Take 0.5-1 tablets by mouth Daily. 8/15/23   Trell Brooks MD     No Known Allergies  Social History     Socioeconomic History    Marital status:    Tobacco Use    Smoking status: Never     Passive exposure: Never    Smokeless tobacco: Never   Vaping Use    Vaping Use: Never used   Substance and Sexual Activity    Alcohol use: Yes     Alcohol/week: 2.0 standard drinks     Types: 1 Cans of beer, 1 Shots of liquor per week    Drug use: Never    Sexual activity: Defer       Review of Systems  Review of Systems   Constitutional:  Negative for chills, fatigue, fever and unexpected weight change.   HENT:  Negative for congestion, ear discharge, hearing loss, nosebleeds and sore throat.    Eyes:  Negative for pain, discharge and redness.   Respiratory:  Negative for cough, chest tightness, shortness of breath and wheezing.    Cardiovascular:  Negative for chest pain and palpitations.   Gastrointestinal:  Negative for abdominal distention, abdominal pain, blood in stool, constipation, diarrhea, nausea and vomiting.   Endocrine: Negative for cold intolerance, polydipsia, polyphagia and polyuria.   Genitourinary:  Negative for dysuria, flank pain, frequency, hematuria and urgency.   Musculoskeletal:  Negative for arthralgias, back pain, joint swelling and myalgias.   Skin:  Negative for color change, pallor and rash.   Neurological:  Negative for tremors, seizures, syncope, weakness and headaches.   Hematological:  Negative for adenopathy. Does not bruise/bleed easily.   Psychiatric/Behavioral:  Negative for behavioral problems, confusion, dysphoric mood, hallucinations and suicidal ideas. The patient is not nervous/anxious.       /85 (BP Location: Right arm)   Ht 177.8 cm (70\")   Wt 110 kg (242 lb)   BMI 34.72 kg/mý     Objective    Physical " Exam  Constitutional:       Appearance: He is well-developed.   HENT:      Head: Normocephalic and atraumatic.   Eyes:      Conjunctiva/sclera: Conjunctivae normal.      Pupils: Pupils are equal, round, and reactive to light.   Neck:      Thyroid: No thyromegaly.   Cardiovascular:      Rate and Rhythm: Normal rate and regular rhythm.      Heart sounds: Normal heart sounds. No murmur heard.  Pulmonary:      Effort: Pulmonary effort is normal.      Breath sounds: Normal breath sounds. No wheezing.   Abdominal:      General: Bowel sounds are normal. There is no distension.      Palpations: Abdomen is soft. There is no mass.      Tenderness: There is no abdominal tenderness.      Hernia: No hernia is present.   Genitourinary:     Comments: No lesions noted  Musculoskeletal:         General: No tenderness. Normal range of motion.      Cervical back: Normal range of motion and neck supple.   Lymphadenopathy:      Cervical: No cervical adenopathy.   Skin:     General: Skin is warm and dry.      Findings: No rash.   Neurological:      Mental Status: He is alert and oriented to person, place, and time.      Cranial Nerves: No cranial nerve deficit.   Psychiatric:         Thought Content: Thought content normal.     Lab on 07/19/2023   Component Date Value Ref Range Status    Iron 07/19/2023 82  59 - 158 mcg/dL Final    Iron Saturation (TSAT) 07/19/2023 23  20 - 50 % Final    Transferrin 07/19/2023 237  200 - 360 mg/dL Final    TIBC 07/19/2023 353  298 - 536 mcg/dL Final    Vitamin B-12 07/19/2023 1,806 (H)  211 - 946 pg/mL Final    Ferritin 07/19/2023 393.90  30.00 - 400.00 ng/mL Final    Folate 07/19/2023 >20.00  4.78 - 24.20 ng/mL Final    WBC 07/19/2023 8.17  3.40 - 10.80 10*3/mm3 Final    RBC 07/19/2023 5.19  4.14 - 5.80 10*6/mm3 Final    Hemoglobin 07/19/2023 15.6  13.0 - 17.7 g/dL Final    Hematocrit 07/19/2023 47.9  37.5 - 51.0 % Final    MCV 07/19/2023 92.3  79.0 - 97.0 fL Final    MCH 07/19/2023 30.1  26.6 - 33.0 pg  Final    MCHC 07/19/2023 32.6  31.5 - 35.7 g/dL Final    RDW 07/19/2023 13.6  12.3 - 15.4 % Final    RDW-SD 07/19/2023 45.9  37.0 - 54.0 fl Final    MPV 07/19/2023 10.0  6.0 - 12.0 fL Final    Platelets 07/19/2023 161  140 - 450 10*3/mm3 Final    Neutrophil % 07/19/2023 56.3  42.7 - 76.0 % Final    Lymphocyte % 07/19/2023 33.0  19.6 - 45.3 % Final    Monocyte % 07/19/2023 8.0  5.0 - 12.0 % Final    Eosinophil % 07/19/2023 1.8  0.3 - 6.2 % Final    Basophil % 07/19/2023 0.7  0.0 - 1.5 % Final    Immature Grans % 07/19/2023 0.2  0.0 - 0.5 % Final    Neutrophils, Absolute 07/19/2023 4.59  1.70 - 7.00 10*3/mm3 Final    Lymphocytes, Absolute 07/19/2023 2.70  0.70 - 3.10 10*3/mm3 Final    Monocytes, Absolute 07/19/2023 0.65  0.10 - 0.90 10*3/mm3 Final    Eosinophils, Absolute 07/19/2023 0.15  0.00 - 0.40 10*3/mm3 Final    Basophils, Absolute 07/19/2023 0.06  0.00 - 0.20 10*3/mm3 Final    Immature Grans, Absolute 07/19/2023 0.02  0.00 - 0.05 10*3/mm3 Final    nRBC 07/19/2023 0.0  0.0 - 0.2 /100 WBC Final    PTH, Intact 07/19/2023 95.5 (H)  15.0 - 65.0 pg/mL Final     Assessment & Plan    No diagnosis found..   1.  Iron deficiency anemia, resolved.  Discontinue iron supplements.  2.  Colon polyp, repeat colonoscopy in 3 years.  3.  Elevated alkaline phosphatase level, normalized.  Repeat LFTs in next visit.  4.  Obesity, recommend exercise and diet control.    Orders placed during this encounter include:  No orders of the defined types were placed in this encounter.      * Surgery not found *    Review and/or summary of lab tests, radiology, procedures, medications. Review and summary of old records and obtaining of history. The risks and benefits of my recommendations, as well as other treatment options were discussed with the patient today. Questions were answered.    No orders of the defined types were placed in this encounter.      Follow-up: Return in about 6 months (around 2/2/2024).               Results for orders  placed or performed in visit on 07/19/23   CBC Auto Differential    Specimen: Arm, Left; Blood   Result Value Ref Range    WBC 8.17 3.40 - 10.80 10*3/mm3    RBC 5.19 4.14 - 5.80 10*6/mm3    Hemoglobin 15.6 13.0 - 17.7 g/dL    Hematocrit 47.9 37.5 - 51.0 %    MCV 92.3 79.0 - 97.0 fL    MCH 30.1 26.6 - 33.0 pg    MCHC 32.6 31.5 - 35.7 g/dL    RDW 13.6 12.3 - 15.4 %    RDW-SD 45.9 37.0 - 54.0 fl    MPV 10.0 6.0 - 12.0 fL    Platelets 161 140 - 450 10*3/mm3    Neutrophil % 56.3 42.7 - 76.0 %    Lymphocyte % 33.0 19.6 - 45.3 %    Monocyte % 8.0 5.0 - 12.0 %    Eosinophil % 1.8 0.3 - 6.2 %    Basophil % 0.7 0.0 - 1.5 %    Immature Grans % 0.2 0.0 - 0.5 %    Neutrophils, Absolute 4.59 1.70 - 7.00 10*3/mm3    Lymphocytes, Absolute 2.70 0.70 - 3.10 10*3/mm3    Monocytes, Absolute 0.65 0.10 - 0.90 10*3/mm3    Eosinophils, Absolute 0.15 0.00 - 0.40 10*3/mm3    Basophils, Absolute 0.06 0.00 - 0.20 10*3/mm3    Immature Grans, Absolute 0.02 0.00 - 0.05 10*3/mm3    nRBC 0.0 0.0 - 0.2 /100 WBC   Iron Profile    Specimen: Blood   Result Value Ref Range    Iron 82 59 - 158 mcg/dL    Iron Saturation (TSAT) 23 20 - 50 %    Transferrin 237 200 - 360 mg/dL    TIBC 353 298 - 536 mcg/dL   PTH, Intact    Specimen: Blood   Result Value Ref Range    PTH, Intact 95.5 (H) 15.0 - 65.0 pg/mL   Folate    Specimen: Blood   Result Value Ref Range    Folate >20.00 4.78 - 24.20 ng/mL   Ferritin    Specimen: Blood   Result Value Ref Range    Ferritin 393.90 30.00 - 400.00 ng/mL   Vitamin B12    Specimen: Blood   Result Value Ref Range    Vitamin B-12 1,806 (H) 211 - 946 pg/mL   Results for orders placed or performed in visit on 06/02/23   Comprehensive Metabolic Panel    Specimen: Arm, Left; Blood   Result Value Ref Range    Glucose 95 65 - 99 mg/dL    BUN 23 8 - 23 mg/dL    Creatinine 0.96 0.76 - 1.27 mg/dL    Sodium 140 136 - 145 mmol/L    Potassium 4.9 3.5 - 5.2 mmol/L    Chloride 107 98 - 107 mmol/L    CO2 25.0 22.0 - 29.0 mmol/L    Calcium 8.2 (L)  8.6 - 10.5 mg/dL    Total Protein 6.2 6.0 - 8.5 g/dL    Albumin 4.0 3.5 - 5.2 g/dL    ALT (SGPT) 28 1 - 41 U/L    AST (SGOT) 27 1 - 40 U/L    Alkaline Phosphatase 116 39 - 117 U/L    Total Bilirubin 0.6 0.0 - 1.2 mg/dL    Globulin 2.2 gm/dL    A/G Ratio 1.8 g/dL    BUN/Creatinine Ratio 24.0 7.0 - 25.0    Anion Gap 8.0 5.0 - 15.0 mmol/L    eGFR 89.9 >60.0 mL/min/1.73   Results for orders placed or performed in visit on 05/24/23   CBC Auto Differential    Specimen: Arm, Left; Blood   Result Value Ref Range    WBC 7.84 3.40 - 10.80 10*3/mm3    RBC 4.92 4.14 - 5.80 10*6/mm3    Hemoglobin 14.2 13.0 - 17.7 g/dL    Hematocrit 43.9 37.5 - 51.0 %    MCV 89.2 79.0 - 97.0 fL    MCH 28.9 26.6 - 33.0 pg    MCHC 32.3 31.5 - 35.7 g/dL    RDW 18.8 (H) 12.3 - 15.4 %    RDW-SD 61.3 (H) 37.0 - 54.0 fl    MPV 10.2 6.0 - 12.0 fL    Platelets 184 140 - 450 10*3/mm3    Neutrophil % 50.8 42.7 - 76.0 %    Lymphocyte % 39.2 19.6 - 45.3 %    Monocyte % 6.8 5.0 - 12.0 %    Eosinophil % 2.0 0.3 - 6.2 %    Basophil % 0.8 0.0 - 1.5 %    Immature Grans % 0.4 0.0 - 0.5 %    Neutrophils, Absolute 3.99 1.70 - 7.00 10*3/mm3    Lymphocytes, Absolute 3.07 0.70 - 3.10 10*3/mm3    Monocytes, Absolute 0.53 0.10 - 0.90 10*3/mm3    Eosinophils, Absolute 0.16 0.00 - 0.40 10*3/mm3    Basophils, Absolute 0.06 0.00 - 0.20 10*3/mm3    Immature Grans, Absolute 0.03 0.00 - 0.05 10*3/mm3    nRBC 0.0 0.0 - 0.2 /100 WBC   Iron and TIBC    Specimen: Arm, Left; Blood   Result Value Ref Range    Iron 49 (L) 59 - 158 mcg/dL    Iron Saturation (TSAT) 12 (L) 20 - 50 %    Transferrin 264 200 - 360 mg/dL    TIBC 393 298 - 536 mcg/dL   Vitamin D,25-Hydroxy    Specimen: Arm, Left; Blood   Result Value Ref Range    25 Hydroxy, Vitamin D 33.7 30.0 - 100.0 ng/ml   PTH, Intact    Specimen: Arm, Left; Blood   Result Value Ref Range    PTH, Intact 102.2 (H) 15.0 - 65.0 pg/mL   Folate    Specimen: Arm, Left; Blood   Result Value Ref Range    Folate 15.60 4.78 - 24.20 ng/mL   Ferritin     Specimen: Arm, Left; Blood   Result Value Ref Range    Ferritin 138.90 30.00 - 400.00 ng/mL   Vitamin B12    Specimen: Arm, Left; Blood   Result Value Ref Range    Vitamin B-12 1,170 (H) 211 - 946 pg/mL   Results for orders placed or performed in visit on 04/26/23   BUN    Specimen: Blood   Result Value Ref Range    BUN 18 8 - 23 mg/dL   Creatinine Serum (kidney function) GFR component    Specimen: Blood   Result Value Ref Range    Creatinine 1.03 0.76 - 1.27 mg/dL    eGFR 82.6 >60.0 mL/min/1.73   Results for orders placed or performed in visit on 03/15/23   CBC Auto Differential    Specimen: Arm, Left; Blood   Result Value Ref Range    WBC 7.82 3.40 - 10.80 10*3/mm3    RBC 4.76 4.14 - 5.80 10*6/mm3    Hemoglobin 11.6 (L) 13.0 - 17.7 g/dL    Hematocrit 38.5 37.5 - 51.0 %    MCV 80.9 79.0 - 97.0 fL    MCH 24.4 (L) 26.6 - 33.0 pg    MCHC 30.1 (L) 31.5 - 35.7 g/dL    RDW 22.9 (H) 12.3 - 15.4 %    RDW-SD 67.0 (H) 37.0 - 54.0 fl    MPV 9.9 6.0 - 12.0 fL    Platelets 172 140 - 450 10*3/mm3    Neutrophil % 51.0 42.7 - 76.0 %    Lymphocyte % 37.0 19.6 - 45.3 %    Monocyte % 7.4 5.0 - 12.0 %    Eosinophil % 3.1 0.3 - 6.2 %    Basophil % 1.2 0.0 - 1.5 %    Immature Grans % 0.3 0.0 - 0.5 %    Neutrophils, Absolute 4.00 1.70 - 7.00 10*3/mm3    Lymphocytes, Absolute 2.89 0.70 - 3.10 10*3/mm3    Monocytes, Absolute 0.58 0.10 - 0.90 10*3/mm3    Eosinophils, Absolute 0.24 0.00 - 0.40 10*3/mm3    Basophils, Absolute 0.09 0.00 - 0.20 10*3/mm3    Immature Grans, Absolute 0.02 0.00 - 0.05 10*3/mm3    nRBC 0.0 0.0 - 0.2 /100 WBC     *Note: Due to a large number of results and/or encounters for the requested time period, some results have not been displayed. A complete set of results can be found in Results Review.         This document has been electronically signed by Carlton Olson MD on August 19, 2023 13:26 CDT

## 2023-08-21 PROBLEM — E66.09 CLASS 1 OBESITY DUE TO EXCESS CALORIES WITH SERIOUS COMORBIDITY AND BODY MASS INDEX (BMI) OF 34.0 TO 34.9 IN ADULT: Status: ACTIVE | Noted: 2023-08-21

## 2023-08-21 PROBLEM — N28.89 RENAL MASS: Status: ACTIVE | Noted: 2023-08-21

## 2023-08-21 PROBLEM — I10 BENIGN ESSENTIAL HTN: Status: ACTIVE | Noted: 2023-08-21

## 2023-08-21 PROBLEM — I72.8 SPLENIC ARTERY ANEURYSM: Status: ACTIVE | Noted: 2023-08-21

## 2023-08-21 NOTE — PROGRESS NOTES
4/27/2023    Blas James  1960    Chief Complaint:  splenic artery aneurysm      HPI:      PCP:  Trell Brooks MD    63 y.o. male with HTN(stable, increased risk stroke, rupture) and Obesity(uncontrolled, increased risk cardiovascular events), splenic artery aneurysm(new, increase risk rupture), anemia(stable).  never smoked.  Asymptomatic splenic artery aneurysm.  CT imaging also shows small LEFT renal mass..  No TIA stroke amaurosis.  No MI claudication. No other associated signs, symptoms or modifying factors.    3/2023 KUB:  Eggshell calcification left upper quadrant may represent splenic  artery aneurysm:    4/2023 CT Abdomen Pelvis:  no splenic artery aneurysm.  14mm exophytic LEFT renal mass.    The following portions of the patient's history were reviewed and updated as appropriate: allergies, current medications, past family history, past medical history, past social history, past surgical history and problem list.  Recent images independently reviewed.  Available laboratory values reviewed.    PMH:  Past Medical History:   Diagnosis Date    Hyperlipidemia     Seasonal allergies      Past Surgical History:   Procedure Laterality Date    COLONOSCOPY N/A 1/17/2023    Procedure: COLONOSCOPY;  Surgeon: Carlton Olson MD;  Location: [facility] ENDOSCOPY;  Service: Gastroenterology;  Laterality: N/A;    ENDOSCOPY N/A 1/17/2023    Procedure: ESOPHAGOGASTRODUODENOSCOPY;  Surgeon: Carlton Olson MD;  Location: [facility] ENDOSCOPY;  Service: Gastroenterology;  Laterality: N/A;    GASTRIC BYPASS       No family history on file.  Social History     Tobacco Use    Smoking status: Never     Passive exposure: Never    Smokeless tobacco: Never   Vaping Use    Vaping Use: Never used   Substance Use Topics    Alcohol use: Yes     Alcohol/week: 2.0 standard drinks     Types: 1 Cans of beer, 1 Shots of liquor per week    Drug use: Never       ALLERGIES:  No Known Allergies      MEDICATIONS:    Current  "Outpatient Medications:     Calcium Citrate (CITRACAL PO), Take 1,200 mg by mouth., Disp: , Rfl:     ferrous sulfate 325 (65 FE) MG tablet, Take 1 tablet by mouth Daily With Breakfast., Disp: , Rfl:     LORATADINE ALLERGY RELIEF PO, Take  by mouth., Disp: , Rfl:     multivitamin with minerals tablet tablet, Take 1 tablet by mouth Daily., Disp: , Rfl:     Vitamin D, Cholecalciferol, (CHOLECALCIFEROL) 10 MCG (400 UNIT) tablet, Take 1 tablet by mouth Daily., Disp: , Rfl:     amLODIPine (NORVASC) 10 MG tablet, Take 1 tablet by mouth Daily., Disp: 90 tablet, Rfl: 3    folic acid (FOLVITE) 1 MG tablet, Take 1 tablet by mouth Daily., Disp: 90 tablet, Rfl: 1    nebivolol (Bystolic) 10 MG tablet, Take 0.5-1 tablets by mouth Daily., Disp: 90 tablet, Rfl: 3    olmesartan (BENICAR) 40 MG tablet, Take 1 tablet by mouth Daily., Disp: 90 tablet, Rfl: 3    vitamin B-12 (CYANOCOBALAMIN) 1000 MCG tablet, Take 1 tablet by mouth Daily., Disp: 90 tablet, Rfl: 1    Review of Systems   Review of Systems   Constitutional: Positive for malaise/fatigue. Negative for weight loss.   Cardiovascular:  Negative for chest pain, claudication and dyspnea on exertion.   Respiratory:  Negative for cough and shortness of breath.    Skin:  Negative for color change and poor wound healing.   Neurological:  Negative for dizziness, numbness and weakness.     Physical Exam   Vitals:    04/27/23 1426 04/27/23 1427   BP: 162/92 160/94   BP Location: Left arm Right arm   Pulse: 61    Temp: 97.8 øF (36.6 øC)    TempSrc: Infrared    SpO2: 98%    Weight: 106 kg (234 lb)    Height: 177.8 cm (70\")      Body surface area is 2.23 meters squared.  Body mass index is 33.58 kg/mý.  Physical Exam  Constitutional:       General: He is not in acute distress.     Appearance: He is not ill-appearing.   HENT:      Right Ear: Hearing normal.      Left Ear: Hearing normal.      Nose: No nasal deformity.      Mouth/Throat:      Dentition: Normal dentition. Does not have " dentures.   Cardiovascular:      Rate and Rhythm: Normal rate and regular rhythm.      Pulses:           Carotid pulses are 2+ on the right side and 2+ on the left side.       Radial pulses are 2+ on the right side and 2+ on the left side.        Dorsalis pedis pulses are 2+ on the right side and 2+ on the left side.        Posterior tibial pulses are 2+ on the right side and 2+ on the left side.      Heart sounds: No murmur heard.  Pulmonary:      Effort: Pulmonary effort is normal.      Breath sounds: Normal breath sounds.   Abdominal:      General: There is no distension.      Palpations: Abdomen is soft. There is no mass.      Tenderness: There is no abdominal tenderness.   Musculoskeletal:         General: No deformity.      Comments: Gait normal.    Skin:     General: Skin is warm and dry.      Coloration: Skin is not pale.      Findings: No erythema.      Comments: No venous staining   Neurological:      Mental Status: He is alert and oriented to person, place, and time.   Psychiatric:         Speech: Speech normal.         Behavior: Behavior is cooperative.         Thought Content: Thought content normal.         Judgment: Judgment normal.       BUN   Date Value Ref Range Status   06/06/2023 23 8 - 23 mg/dL Final     Creatinine   Date Value Ref Range Status   06/06/2023 0.96 0.76 - 1.27 mg/dL Final       ASSESSMENT:  Diagnoses and all orders for this visit:    1. Splenic artery aneurysm (Primary)    2. History of gastric bypass    3. Class 1 obesity due to excess calories with serious comorbidity and body mass index (BMI) of 34.0 to 34.9 in adult    4. Benign essential HTN    5. Renal mass  -     Ambulatory Referral to Urology    PLAN:  Detailed discussion with Blas James regarding situation and options.  No splenic artery aneurysm.  Small LEFT renal mass, Urology evaluation.  Multiple risk factors with severe comorbidities.  No intervention indicated at this time.  Will follow with interval  imaging.  Risks, benefits discussed.  Understands and wishes to proceed with plan.    Urology referral, Dr Howard.    Return after above studies complete  Obesity Class  1. Increased risk cardiovascular events, sleep and breathing disorders, joint issues, type 2 diabetes mellitus. Options for weight management, heart healthy diet, exercise programs, and associated health risks of obesity discussed.    Recommended regular physical activity, progressive walking program.  Continue current medications as directed.  Will Obtain relevant old records.  Advance Care Planning   ACP discussion was declined by the patient. Patient does not have an advance directive, declines further assistance.     Thank you for the opportunity to participate in this patient's care.    Copy to primary care provider.

## 2023-09-29 ENCOUNTER — TRANSCRIBE ORDERS (OUTPATIENT)
Dept: CT IMAGING | Facility: HOSPITAL | Age: 63
End: 2023-09-29
Payer: COMMERCIAL

## 2023-09-29 ENCOUNTER — LAB (OUTPATIENT)
Dept: LAB | Facility: HOSPITAL | Age: 63
End: 2023-09-29
Payer: COMMERCIAL

## 2023-09-29 ENCOUNTER — HOSPITAL ENCOUNTER (OUTPATIENT)
Dept: CT IMAGING | Facility: HOSPITAL | Age: 63
Discharge: HOME | End: 2023-09-29
Payer: COMMERCIAL

## 2023-09-29 DIAGNOSIS — N28.89 RENAL MASS: Primary | ICD-10-CM

## 2023-09-29 DIAGNOSIS — N28.89 RENAL MASS: ICD-10-CM

## 2023-09-29 LAB
BUN SERPL-MCNC: 19 MG/DL (ref 8–23)
CREAT SERPL-MCNC: 1.03 MG/DL (ref 0.76–1.27)
EGFRCR SERPLBLD CKD-EPI 2021: 82.1 ML/MIN/1.73

## 2023-09-29 PROCEDURE — 36415 COLL VENOUS BLD VENIPUNCTURE: CPT

## 2023-09-29 PROCEDURE — 25510000001 IOPAMIDOL 61 % SOLUTION: Performed by: UROLOGY

## 2023-09-29 PROCEDURE — 74160 CT ABDOMEN W/CONTRAST: CPT

## 2023-09-29 PROCEDURE — 82565 ASSAY OF CREATININE: CPT

## 2023-09-29 PROCEDURE — 84520 ASSAY OF UREA NITROGEN: CPT

## 2023-09-29 RX ADMIN — IOPAMIDOL 90 ML: 612 INJECTION, SOLUTION INTRAVENOUS at 16:17

## (undated) DEVICE — SINGLE-USE BIOPSY FORCEPS: Brand: RADIAL JAW 4

## (undated) DEVICE — BITEBLOCK ENDO W/STRAP 60F A/ LF DISP